# Patient Record
Sex: FEMALE | Race: WHITE | Employment: OTHER | URBAN - METROPOLITAN AREA
[De-identification: names, ages, dates, MRNs, and addresses within clinical notes are randomized per-mention and may not be internally consistent; named-entity substitution may affect disease eponyms.]

---

## 2017-01-04 LAB — HEMOGLOBIN: 14.9 G/DL (ref 12–16)

## 2017-01-23 ENCOUNTER — TELEPHONE (OUTPATIENT)
Dept: INTERNAL MEDICINE CLINIC | Age: 77
End: 2017-01-23

## 2017-01-23 DIAGNOSIS — M48.061 LUMBAR SPINAL STENOSIS: Primary | ICD-10-CM

## 2017-01-23 DIAGNOSIS — Z79.899 ENCOUNTER FOR LONG-TERM (CURRENT) USE OF MEDICATIONS: ICD-10-CM

## 2017-01-23 DIAGNOSIS — K76.0 FATTY LIVER: ICD-10-CM

## 2017-01-23 DIAGNOSIS — Z51.81 ENCOUNTER FOR THERAPEUTIC DRUG MONITORING: ICD-10-CM

## 2017-01-23 DIAGNOSIS — R73.02 GLUCOSE INTOLERANCE (IMPAIRED GLUCOSE TOLERANCE): ICD-10-CM

## 2017-01-23 DIAGNOSIS — I10 ESSENTIAL HYPERTENSION: ICD-10-CM

## 2017-01-23 DIAGNOSIS — E78.2 MIXED HYPERLIPIDEMIA: ICD-10-CM

## 2017-02-06 ENCOUNTER — OFFICE VISIT (OUTPATIENT)
Dept: INTERNAL MEDICINE CLINIC | Age: 77
End: 2017-02-06

## 2017-02-06 VITALS
HEART RATE: 68 BPM | SYSTOLIC BLOOD PRESSURE: 140 MMHG | RESPIRATION RATE: 12 BRPM | DIASTOLIC BLOOD PRESSURE: 70 MMHG | WEIGHT: 188.4 LBS | BODY MASS INDEX: 31.84 KG/M2

## 2017-02-06 DIAGNOSIS — R73.02 GLUCOSE INTOLERANCE (IMPAIRED GLUCOSE TOLERANCE): ICD-10-CM

## 2017-02-06 DIAGNOSIS — I10 ESSENTIAL HYPERTENSION: Primary | ICD-10-CM

## 2017-02-06 DIAGNOSIS — M48.061 LUMBAR SPINAL STENOSIS: ICD-10-CM

## 2017-02-06 DIAGNOSIS — E78.2 MIXED HYPERLIPIDEMIA: ICD-10-CM

## 2017-02-06 PROCEDURE — 99213 OFFICE O/P EST LOW 20 MIN: CPT | Performed by: INTERNAL MEDICINE

## 2017-02-06 RX ORDER — TRAMADOL HYDROCHLORIDE 200 MG/1
200 TABLET, EXTENDED RELEASE ORAL DAILY
Qty: 90 TABLET | Refills: 0 | Status: SHIPPED | OUTPATIENT
Start: 2017-02-06 | End: 2017-05-04 | Stop reason: SDUPTHER

## 2017-02-24 ENCOUNTER — OFFICE VISIT (OUTPATIENT)
Dept: INTERNAL MEDICINE CLINIC | Age: 77
End: 2017-02-24

## 2017-02-24 VITALS
BODY MASS INDEX: 31.43 KG/M2 | SYSTOLIC BLOOD PRESSURE: 150 MMHG | HEART RATE: 76 BPM | DIASTOLIC BLOOD PRESSURE: 80 MMHG | WEIGHT: 186 LBS

## 2017-02-24 DIAGNOSIS — I10 UNCONTROLLED HYPERTENSION: Primary | ICD-10-CM

## 2017-02-24 PROCEDURE — 99212 OFFICE O/P EST SF 10 MIN: CPT | Performed by: INTERNAL MEDICINE

## 2017-02-24 RX ORDER — LISINOPRIL 10 MG/1
10 TABLET ORAL DAILY
Qty: 30 TABLET | Refills: 3 | Status: SHIPPED | OUTPATIENT
Start: 2017-02-24 | End: 2017-05-04 | Stop reason: SDUPTHER

## 2017-03-07 RX ORDER — ROPINIROLE 1 MG/1
TABLET, FILM COATED ORAL
Qty: 90 TABLET | Refills: 3 | Status: SHIPPED | OUTPATIENT
Start: 2017-03-07 | End: 2017-08-16 | Stop reason: SDUPTHER

## 2017-03-23 ENCOUNTER — TELEPHONE (OUTPATIENT)
Dept: INTERNAL MEDICINE CLINIC | Age: 77
End: 2017-03-23

## 2017-03-24 ENCOUNTER — OFFICE VISIT (OUTPATIENT)
Dept: INTERNAL MEDICINE CLINIC | Age: 77
End: 2017-03-24

## 2017-03-24 VITALS
RESPIRATION RATE: 14 BRPM | BODY MASS INDEX: 31.37 KG/M2 | SYSTOLIC BLOOD PRESSURE: 120 MMHG | DIASTOLIC BLOOD PRESSURE: 70 MMHG | WEIGHT: 185.6 LBS | HEART RATE: 64 BPM

## 2017-03-24 DIAGNOSIS — M48.061 LUMBAR SPINAL STENOSIS: ICD-10-CM

## 2017-03-24 DIAGNOSIS — I10 ESSENTIAL HYPERTENSION: Primary | ICD-10-CM

## 2017-03-24 DIAGNOSIS — M62.830 LUMBAR PARASPINAL MUSCLE SPASM: ICD-10-CM

## 2017-03-24 DIAGNOSIS — E87.6 HYPOKALEMIA: ICD-10-CM

## 2017-03-24 LAB
ANION GAP SERPL CALCULATED.3IONS-SCNC: 14 MMOL/L (ref 3–16)
BUN BLDV-MCNC: 15 MG/DL (ref 7–20)
CALCIUM SERPL-MCNC: 9.4 MG/DL (ref 8.3–10.6)
CHLORIDE BLD-SCNC: 99 MMOL/L (ref 99–110)
CO2: 27 MMOL/L (ref 21–32)
CREAT SERPL-MCNC: 0.5 MG/DL (ref 0.6–1.2)
GFR AFRICAN AMERICAN: >60
GFR NON-AFRICAN AMERICAN: >60
GLUCOSE BLD-MCNC: 90 MG/DL (ref 70–99)
POTASSIUM SERPL-SCNC: 4 MMOL/L (ref 3.5–5.1)
SODIUM BLD-SCNC: 140 MMOL/L (ref 136–145)

## 2017-03-24 PROCEDURE — 99213 OFFICE O/P EST LOW 20 MIN: CPT | Performed by: INTERNAL MEDICINE

## 2017-03-24 PROCEDURE — 36415 COLL VENOUS BLD VENIPUNCTURE: CPT | Performed by: INTERNAL MEDICINE

## 2017-03-24 RX ORDER — TRAMADOL HYDROCHLORIDE 50 MG/1
TABLET ORAL
Qty: 270 TABLET | Refills: 0 | Status: SHIPPED | OUTPATIENT
Start: 2017-03-24 | End: 2017-05-04 | Stop reason: SDUPTHER

## 2017-03-24 ASSESSMENT — PATIENT HEALTH QUESTIONNAIRE - PHQ9
1. LITTLE INTEREST OR PLEASURE IN DOING THINGS: 0
SUM OF ALL RESPONSES TO PHQ QUESTIONS 1-9: 0
2. FEELING DOWN, DEPRESSED OR HOPELESS: 0
SUM OF ALL RESPONSES TO PHQ9 QUESTIONS 1 & 2: 0

## 2017-03-28 ENCOUNTER — TELEPHONE (OUTPATIENT)
Dept: INTERNAL MEDICINE CLINIC | Age: 77
End: 2017-03-28

## 2017-03-28 RX ORDER — METHYLPREDNISOLONE 4 MG/1
TABLET ORAL
Qty: 1 KIT | Refills: 0 | Status: SHIPPED | OUTPATIENT
Start: 2017-03-28 | End: 2017-03-31 | Stop reason: SINTOL

## 2017-03-28 RX ORDER — PROMETHAZINE HYDROCHLORIDE AND CODEINE PHOSPHATE 6.25; 1 MG/5ML; MG/5ML
5 SYRUP ORAL EVERY 6 HOURS PRN
Qty: 120 ML | Refills: 1 | Status: SHIPPED | OUTPATIENT
Start: 2017-03-28 | End: 2017-08-16

## 2017-05-04 RX ORDER — TRAMADOL HYDROCHLORIDE 200 MG/1
200 TABLET, EXTENDED RELEASE ORAL DAILY
Qty: 90 TABLET | Refills: 0 | Status: SHIPPED | OUTPATIENT
Start: 2017-05-04 | End: 2017-08-16 | Stop reason: SDUPTHER

## 2017-05-04 RX ORDER — LISINOPRIL 10 MG/1
10 TABLET ORAL DAILY
Qty: 30 TABLET | Refills: 5 | Status: SHIPPED | OUTPATIENT
Start: 2017-05-04 | End: 2017-05-12 | Stop reason: SDUPTHER

## 2017-05-04 RX ORDER — TRAMADOL HYDROCHLORIDE 50 MG/1
TABLET ORAL
Qty: 270 TABLET | Refills: 0 | Status: SHIPPED | OUTPATIENT
Start: 2017-05-04 | End: 2017-06-01 | Stop reason: SDUPTHER

## 2017-05-05 ENCOUNTER — TELEPHONE (OUTPATIENT)
Dept: INTERNAL MEDICINE CLINIC | Age: 77
End: 2017-05-05

## 2017-05-08 ENCOUNTER — OFFICE VISIT (OUTPATIENT)
Dept: INTERNAL MEDICINE CLINIC | Age: 77
End: 2017-05-08

## 2017-05-08 VITALS
SYSTOLIC BLOOD PRESSURE: 130 MMHG | RESPIRATION RATE: 14 BRPM | WEIGHT: 182 LBS | HEART RATE: 68 BPM | BODY MASS INDEX: 30.76 KG/M2 | DIASTOLIC BLOOD PRESSURE: 76 MMHG

## 2017-05-08 DIAGNOSIS — I10 ESSENTIAL HYPERTENSION: ICD-10-CM

## 2017-05-08 DIAGNOSIS — M62.830 LUMBAR PARASPINAL MUSCLE SPASM: ICD-10-CM

## 2017-05-08 DIAGNOSIS — M48.061 LUMBAR SPINAL STENOSIS: Primary | ICD-10-CM

## 2017-05-08 DIAGNOSIS — G25.81 RESTLESS LEG SYNDROME: ICD-10-CM

## 2017-05-08 PROCEDURE — 99213 OFFICE O/P EST LOW 20 MIN: CPT | Performed by: INTERNAL MEDICINE

## 2017-05-08 ASSESSMENT — PATIENT HEALTH QUESTIONNAIRE - PHQ9
1. LITTLE INTEREST OR PLEASURE IN DOING THINGS: 0
SUM OF ALL RESPONSES TO PHQ9 QUESTIONS 1 & 2: 0
SUM OF ALL RESPONSES TO PHQ QUESTIONS 1-9: 0
2. FEELING DOWN, DEPRESSED OR HOPELESS: 0

## 2017-05-12 RX ORDER — LISINOPRIL 10 MG/1
10 TABLET ORAL DAILY
Qty: 90 TABLET | Refills: 3 | Status: SHIPPED | OUTPATIENT
Start: 2017-05-12 | End: 2017-05-26 | Stop reason: SDUPTHER

## 2017-05-26 RX ORDER — LISINOPRIL 10 MG/1
10 TABLET ORAL DAILY
Qty: 90 TABLET | Refills: 3 | Status: SHIPPED | OUTPATIENT
Start: 2017-05-26 | End: 2018-07-24 | Stop reason: SDUPTHER

## 2017-05-31 RX ORDER — GABAPENTIN 300 MG/1
CAPSULE ORAL
Qty: 450 CAPSULE | Refills: 3 | Status: SHIPPED | OUTPATIENT
Start: 2017-05-31 | End: 2018-02-27 | Stop reason: SDUPTHER

## 2017-06-01 RX ORDER — TRAMADOL HYDROCHLORIDE 50 MG/1
TABLET ORAL
Qty: 270 TABLET | Refills: 0
Start: 2017-06-01 | End: 2017-08-16 | Stop reason: SDUPTHER

## 2017-06-29 ENCOUNTER — TELEPHONE (OUTPATIENT)
Dept: INTERNAL MEDICINE CLINIC | Age: 77
End: 2017-06-29

## 2017-06-29 RX ORDER — METHYLPREDNISOLONE 4 MG/1
TABLET ORAL
Qty: 1 KIT | Refills: 0 | Status: SHIPPED | OUTPATIENT
Start: 2017-06-29 | End: 2017-07-05

## 2017-07-10 ENCOUNTER — TELEPHONE (OUTPATIENT)
Dept: INTERNAL MEDICINE CLINIC | Age: 77
End: 2017-07-10

## 2017-07-10 DIAGNOSIS — M54.10 RADICULOPATHY OF LEG: ICD-10-CM

## 2017-07-10 DIAGNOSIS — M48.061 LUMBAR SPINAL STENOSIS: Primary | ICD-10-CM

## 2017-07-17 ENCOUNTER — TELEPHONE (OUTPATIENT)
Dept: INTERNAL MEDICINE CLINIC | Age: 77
End: 2017-07-17

## 2017-07-17 DIAGNOSIS — M54.16 LUMBAR RADICULAR PAIN: ICD-10-CM

## 2017-07-17 DIAGNOSIS — M48.061 LUMBAR SPINAL STENOSIS: Primary | ICD-10-CM

## 2017-08-16 ENCOUNTER — OFFICE VISIT (OUTPATIENT)
Dept: INTERNAL MEDICINE CLINIC | Age: 77
End: 2017-08-16

## 2017-08-16 VITALS
HEIGHT: 64 IN | DIASTOLIC BLOOD PRESSURE: 72 MMHG | SYSTOLIC BLOOD PRESSURE: 140 MMHG | BODY MASS INDEX: 30.93 KG/M2 | WEIGHT: 181.2 LBS | HEART RATE: 76 BPM

## 2017-08-16 DIAGNOSIS — Z12.11 COLON CANCER SCREENING: ICD-10-CM

## 2017-08-16 DIAGNOSIS — K21.9 GASTROESOPHAGEAL REFLUX DISEASE WITHOUT ESOPHAGITIS: ICD-10-CM

## 2017-08-16 DIAGNOSIS — E03.4 HYPOTHYROIDISM DUE TO ACQUIRED ATROPHY OF THYROID: ICD-10-CM

## 2017-08-16 DIAGNOSIS — I10 ESSENTIAL HYPERTENSION: Primary | ICD-10-CM

## 2017-08-16 DIAGNOSIS — R00.2 PALPITATIONS: ICD-10-CM

## 2017-08-16 DIAGNOSIS — R73.02 GLUCOSE INTOLERANCE (IMPAIRED GLUCOSE TOLERANCE): ICD-10-CM

## 2017-08-16 DIAGNOSIS — E55.9 VITAMIN D DEFICIENCY: ICD-10-CM

## 2017-08-16 DIAGNOSIS — M48.061 LUMBAR SPINAL STENOSIS: ICD-10-CM

## 2017-08-16 DIAGNOSIS — E78.2 MIXED HYPERLIPIDEMIA: ICD-10-CM

## 2017-08-16 LAB
A/G RATIO: 1.7 (ref 1.1–2.2)
ALBUMIN SERPL-MCNC: 4.1 G/DL (ref 3.4–5)
ALP BLD-CCNC: 55 U/L (ref 40–129)
ALT SERPL-CCNC: 26 U/L (ref 10–40)
ANION GAP SERPL CALCULATED.3IONS-SCNC: 10 MMOL/L (ref 3–16)
AST SERPL-CCNC: 23 U/L (ref 15–37)
BACTERIA: ABNORMAL /HPF
BASOPHILS ABSOLUTE: 0 K/UL (ref 0–0.2)
BASOPHILS RELATIVE PERCENT: 0.5 %
BILIRUB SERPL-MCNC: 0.6 MG/DL (ref 0–1)
BILIRUBIN URINE: NEGATIVE
BLOOD, URINE: NEGATIVE
BUN BLDV-MCNC: 13 MG/DL (ref 7–20)
CALCIUM SERPL-MCNC: 9.1 MG/DL (ref 8.3–10.6)
CHLORIDE BLD-SCNC: 99 MMOL/L (ref 99–110)
CHOLESTEROL, TOTAL: 151 MG/DL (ref 0–199)
CLARITY: ABNORMAL
CO2: 29 MMOL/L (ref 21–32)
COLOR: YELLOW
CREAT SERPL-MCNC: 0.5 MG/DL (ref 0.6–1.2)
EOSINOPHILS ABSOLUTE: 0.1 K/UL (ref 0–0.6)
EOSINOPHILS RELATIVE PERCENT: 1.3 %
EPITHELIAL CELLS, UA: 17 /HPF (ref 0–5)
ESTIMATED AVERAGE GLUCOSE: 108.3 MG/DL
GFR AFRICAN AMERICAN: >60
GFR NON-AFRICAN AMERICAN: >60
GLOBULIN: 2.4 G/DL
GLUCOSE BLD-MCNC: 106 MG/DL (ref 70–99)
GLUCOSE URINE: NEGATIVE MG/DL
HBA1C MFR BLD: 5.4 %
HCT VFR BLD CALC: 41.2 % (ref 36–48)
HDLC SERPL-MCNC: 40 MG/DL (ref 40–60)
HEMOGLOBIN: 13.7 G/DL (ref 12–16)
HYALINE CASTS: 9 /LPF (ref 0–8)
KETONES, URINE: NEGATIVE MG/DL
LDL CHOLESTEROL CALCULATED: 77 MG/DL
LEUKOCYTE ESTERASE, URINE: ABNORMAL
LYMPHOCYTES ABSOLUTE: 1.4 K/UL (ref 1–5.1)
LYMPHOCYTES RELATIVE PERCENT: 29.4 %
MCH RBC QN AUTO: 29.1 PG (ref 26–34)
MCHC RBC AUTO-ENTMCNC: 33.2 G/DL (ref 31–36)
MCV RBC AUTO: 87.8 FL (ref 80–100)
MICROSCOPIC EXAMINATION: YES
MONOCYTES ABSOLUTE: 0.5 K/UL (ref 0–1.3)
MONOCYTES RELATIVE PERCENT: 11 %
NEUTROPHILS ABSOLUTE: 2.7 K/UL (ref 1.7–7.7)
NEUTROPHILS RELATIVE PERCENT: 57.8 %
NITRITE, URINE: NEGATIVE
PDW BLD-RTO: 13.4 % (ref 12.4–15.4)
PH UA: 5.5
PLATELET # BLD: 158 K/UL (ref 135–450)
PMV BLD AUTO: 10.7 FL (ref 5–10.5)
POTASSIUM SERPL-SCNC: 3.9 MMOL/L (ref 3.5–5.1)
PROTEIN UA: NEGATIVE MG/DL
RBC # BLD: 4.69 M/UL (ref 4–5.2)
RBC UA: 7 /HPF (ref 0–4)
SODIUM BLD-SCNC: 138 MMOL/L (ref 136–145)
SPECIFIC GRAVITY UA: 1.02
TOTAL PROTEIN: 6.5 G/DL (ref 6.4–8.2)
TRIGL SERPL-MCNC: 169 MG/DL (ref 0–150)
TSH SERPL DL<=0.05 MIU/L-ACNC: 1.69 UIU/ML (ref 0.27–4.2)
UROBILINOGEN, URINE: 0.2 E.U./DL
VITAMIN D 25-HYDROXY: 44 NG/ML
VLDLC SERPL CALC-MCNC: 34 MG/DL
WBC # BLD: 4.8 K/UL (ref 4–11)
WBC UA: 7 /HPF (ref 0–5)

## 2017-08-16 PROCEDURE — 81001 URINALYSIS AUTO W/SCOPE: CPT | Performed by: INTERNAL MEDICINE

## 2017-08-16 PROCEDURE — 93000 ELECTROCARDIOGRAM COMPLETE: CPT | Performed by: INTERNAL MEDICINE

## 2017-08-16 PROCEDURE — 36415 COLL VENOUS BLD VENIPUNCTURE: CPT | Performed by: INTERNAL MEDICINE

## 2017-08-16 PROCEDURE — 99215 OFFICE O/P EST HI 40 MIN: CPT | Performed by: INTERNAL MEDICINE

## 2017-08-16 RX ORDER — TRAMADOL HYDROCHLORIDE 200 MG/1
200 TABLET, EXTENDED RELEASE ORAL DAILY
Qty: 90 TABLET | Refills: 0 | Status: SHIPPED | OUTPATIENT
Start: 2017-08-16 | End: 2017-11-15 | Stop reason: SDUPTHER

## 2017-08-16 RX ORDER — TRAMADOL HYDROCHLORIDE 50 MG/1
TABLET ORAL
Qty: 270 TABLET | Refills: 0 | Status: SHIPPED | OUTPATIENT
Start: 2017-08-16 | End: 2017-11-15 | Stop reason: SDUPTHER

## 2017-08-16 RX ORDER — ROPINIROLE 1 MG/1
TABLET, FILM COATED ORAL
Qty: 150 TABLET | Refills: 3 | Status: SHIPPED | OUTPATIENT
Start: 2017-08-16 | End: 2018-07-24 | Stop reason: SDUPTHER

## 2017-08-18 DIAGNOSIS — Z12.11 COLON CANCER SCREENING: ICD-10-CM

## 2017-08-18 LAB
CONTROL: NORMAL
HEMOCCULT STL QL: NORMAL

## 2017-08-30 ENCOUNTER — OFFICE VISIT (OUTPATIENT)
Dept: INTERNAL MEDICINE CLINIC | Age: 77
End: 2017-08-30

## 2017-08-30 VITALS — HEART RATE: 66 BPM | DIASTOLIC BLOOD PRESSURE: 70 MMHG | SYSTOLIC BLOOD PRESSURE: 125 MMHG

## 2017-08-30 DIAGNOSIS — M48.061 LUMBAR SPINAL STENOSIS: Primary | ICD-10-CM

## 2017-08-30 DIAGNOSIS — K76.0 FATTY LIVER: ICD-10-CM

## 2017-08-30 DIAGNOSIS — E78.1 HYPERTRIGLYCERIDEMIA: ICD-10-CM

## 2017-08-30 DIAGNOSIS — I10 ESSENTIAL HYPERTENSION: ICD-10-CM

## 2017-08-30 PROCEDURE — 99213 OFFICE O/P EST LOW 20 MIN: CPT | Performed by: INTERNAL MEDICINE

## 2017-11-01 ENCOUNTER — NURSE ONLY (OUTPATIENT)
Dept: INTERNAL MEDICINE CLINIC | Age: 77
End: 2017-11-01

## 2017-11-01 DIAGNOSIS — Z23 NEED FOR INFLUENZA VACCINATION: Primary | ICD-10-CM

## 2017-11-01 PROCEDURE — 90471 IMMUNIZATION ADMIN: CPT | Performed by: INTERNAL MEDICINE

## 2017-11-01 PROCEDURE — 90662 IIV NO PRSV INCREASED AG IM: CPT | Performed by: INTERNAL MEDICINE

## 2017-11-16 RX ORDER — TRAMADOL HYDROCHLORIDE 50 MG/1
TABLET ORAL
Qty: 270 TABLET | Refills: 0 | Status: SHIPPED | OUTPATIENT
Start: 2017-11-16 | End: 2018-02-20 | Stop reason: SDUPTHER

## 2017-11-16 RX ORDER — TRAMADOL HYDROCHLORIDE 200 MG/1
200 TABLET, EXTENDED RELEASE ORAL DAILY
Qty: 90 TABLET | Refills: 0 | Status: SHIPPED | OUTPATIENT
Start: 2017-11-16 | End: 2018-02-20 | Stop reason: SDUPTHER

## 2017-11-16 NOTE — TELEPHONE ENCOUNTER
Controlled Substances Monitoring:     Attestation: The Prescription Monitoring Report for this patient was reviewed today. Aline Quintero MD)  Documentation: No signs of potential drug abuse or diversion identified.  Aline Quintero MD)

## 2017-12-08 ENCOUNTER — TELEPHONE (OUTPATIENT)
Dept: INTERNAL MEDICINE CLINIC | Age: 77
End: 2017-12-08

## 2017-12-08 DIAGNOSIS — E78.2 MIXED HYPERLIPIDEMIA: ICD-10-CM

## 2017-12-08 DIAGNOSIS — K76.0 FATTY LIVER: Primary | ICD-10-CM

## 2017-12-08 DIAGNOSIS — R73.02 GLUCOSE INTOLERANCE (IMPAIRED GLUCOSE TOLERANCE): ICD-10-CM

## 2017-12-08 DIAGNOSIS — I10 ESSENTIAL HYPERTENSION: ICD-10-CM

## 2017-12-19 DIAGNOSIS — R73.02 GLUCOSE INTOLERANCE (IMPAIRED GLUCOSE TOLERANCE): ICD-10-CM

## 2017-12-19 DIAGNOSIS — I10 ESSENTIAL HYPERTENSION: ICD-10-CM

## 2017-12-19 DIAGNOSIS — E78.2 MIXED HYPERLIPIDEMIA: ICD-10-CM

## 2017-12-19 DIAGNOSIS — K76.0 FATTY LIVER: ICD-10-CM

## 2017-12-19 LAB
ALBUMIN SERPL-MCNC: 4.5 G/DL (ref 3.4–5)
ALP BLD-CCNC: 69 U/L (ref 40–129)
ALT SERPL-CCNC: 34 U/L (ref 10–40)
ANION GAP SERPL CALCULATED.3IONS-SCNC: 13 MMOL/L (ref 3–16)
AST SERPL-CCNC: 31 U/L (ref 15–37)
BASOPHILS ABSOLUTE: 0 K/UL (ref 0–0.2)
BASOPHILS RELATIVE PERCENT: 0.4 %
BILIRUB SERPL-MCNC: 0.7 MG/DL (ref 0–1)
BILIRUBIN DIRECT: <0.2 MG/DL (ref 0–0.3)
BILIRUBIN, INDIRECT: NORMAL MG/DL (ref 0–1)
BUN BLDV-MCNC: 15 MG/DL (ref 7–20)
CALCIUM SERPL-MCNC: 9.7 MG/DL (ref 8.3–10.6)
CHLORIDE BLD-SCNC: 100 MMOL/L (ref 99–110)
CHOLESTEROL, TOTAL: 162 MG/DL (ref 0–199)
CO2: 29 MMOL/L (ref 21–32)
CREAT SERPL-MCNC: 0.6 MG/DL (ref 0.6–1.2)
EOSINOPHILS ABSOLUTE: 0.1 K/UL (ref 0–0.6)
EOSINOPHILS RELATIVE PERCENT: 1.6 %
GFR AFRICAN AMERICAN: >60
GFR NON-AFRICAN AMERICAN: >60
GLUCOSE BLD-MCNC: 108 MG/DL (ref 70–99)
HCT VFR BLD CALC: 42.2 % (ref 36–48)
HDLC SERPL-MCNC: 39 MG/DL (ref 40–60)
HEMOGLOBIN: 13.7 G/DL (ref 12–16)
LDL CHOLESTEROL CALCULATED: 80 MG/DL
LYMPHOCYTES ABSOLUTE: 1.7 K/UL (ref 1–5.1)
LYMPHOCYTES RELATIVE PERCENT: 32.2 %
MCH RBC QN AUTO: 27.9 PG (ref 26–34)
MCHC RBC AUTO-ENTMCNC: 32.4 G/DL (ref 31–36)
MCV RBC AUTO: 86 FL (ref 80–100)
MONOCYTES ABSOLUTE: 0.5 K/UL (ref 0–1.3)
MONOCYTES RELATIVE PERCENT: 10.2 %
NEUTROPHILS ABSOLUTE: 2.8 K/UL (ref 1.7–7.7)
NEUTROPHILS RELATIVE PERCENT: 55.6 %
PDW BLD-RTO: 14.6 % (ref 12.4–15.4)
PLATELET # BLD: 176 K/UL (ref 135–450)
PMV BLD AUTO: 10.2 FL (ref 5–10.5)
POTASSIUM SERPL-SCNC: 4.9 MMOL/L (ref 3.5–5.1)
RBC # BLD: 4.91 M/UL (ref 4–5.2)
SODIUM BLD-SCNC: 142 MMOL/L (ref 136–145)
TOTAL PROTEIN: 7.3 G/DL (ref 6.4–8.2)
TRIGL SERPL-MCNC: 216 MG/DL (ref 0–150)
VLDLC SERPL CALC-MCNC: 43 MG/DL
WBC # BLD: 5.1 K/UL (ref 4–11)

## 2017-12-22 RX ORDER — INDAPAMIDE 1.25 MG/1
TABLET, FILM COATED ORAL
Qty: 90 TABLET | Refills: 3 | Status: SHIPPED | OUTPATIENT
Start: 2017-12-22 | End: 2018-12-17 | Stop reason: SDUPTHER

## 2017-12-26 ENCOUNTER — TELEPHONE (OUTPATIENT)
Dept: INTERNAL MEDICINE CLINIC | Age: 77
End: 2017-12-26

## 2017-12-26 RX ORDER — BENZONATATE 100 MG/1
100 CAPSULE ORAL 3 TIMES DAILY PRN
Qty: 30 CAPSULE | Refills: 0 | Status: SHIPPED | OUTPATIENT
Start: 2017-12-26 | End: 2018-01-05

## 2017-12-26 RX ORDER — OSELTAMIVIR PHOSPHATE 75 MG/1
75 CAPSULE ORAL 2 TIMES DAILY
Qty: 10 CAPSULE | Refills: 0 | Status: SHIPPED | OUTPATIENT
Start: 2017-12-26 | End: 2017-12-31

## 2017-12-26 NOTE — TELEPHONE ENCOUNTER
Pt called and states she has had a productive cough, sore throat, stuffed ears and headache since Sunday and has had no relief w/ OTC meds. She would like something called in for her if possible. [facility] on [address].

## 2017-12-29 ENCOUNTER — OFFICE VISIT (OUTPATIENT)
Dept: INTERNAL MEDICINE CLINIC | Age: 77
End: 2017-12-29

## 2017-12-29 VITALS
WEIGHT: 181.1 LBS | BODY MASS INDEX: 31.58 KG/M2 | DIASTOLIC BLOOD PRESSURE: 70 MMHG | HEART RATE: 88 BPM | SYSTOLIC BLOOD PRESSURE: 135 MMHG

## 2017-12-29 DIAGNOSIS — K76.0 FATTY LIVER: ICD-10-CM

## 2017-12-29 DIAGNOSIS — I10 ESSENTIAL HYPERTENSION: ICD-10-CM

## 2017-12-29 DIAGNOSIS — K11.20 SIALADENITIS: Primary | ICD-10-CM

## 2017-12-29 DIAGNOSIS — E78.2 MIXED HYPERLIPIDEMIA: ICD-10-CM

## 2017-12-29 PROCEDURE — 99213 OFFICE O/P EST LOW 20 MIN: CPT | Performed by: INTERNAL MEDICINE

## 2017-12-29 RX ORDER — DOXYCYCLINE HYCLATE 100 MG
100 TABLET ORAL 2 TIMES DAILY
Qty: 14 TABLET | Refills: 0 | Status: SHIPPED | OUTPATIENT
Start: 2017-12-29 | End: 2018-01-05

## 2018-01-06 NOTE — PROGRESS NOTES
S: Patient presents with problems of HTN, lumbar spinal stenosis with chronic low back pain, hyperlipidemia, fatty liver, and glucose intolerance. She was seen at [facility] by Dr Smita Alfredo, neurosurgery, for her chronic low back pain and was scheduled for surgery in Dec but decided to cancel this. No headache, chest pain, or breathing difficulties. No palpitations, dizziness, or syncope. She is having swelling under his left jaw. No fever or chills. O:Blood pressure 135/70, pulse 88, weight 181 lb 1.6 oz (82.1 kg), not currently breastfeeding.        HEENT: oral pharynx cleared, no facial swelling      Neck: No palpable lymph nodes, normal thyroid examination, left submandibular swelling and tenderness      Lungs: clear      Cardio: reg pulse      Back: bilateral lumbar paraspinal muscle spasms & tenderness       Ext: no edema        Labs: from 12/19/17 CBC normal,  Lytes Renal Liver normal, Glucose 108, LDL 80 HDL 39 Trig 216    A: Hypertension controlled      Lumbar spinal stenosis on chronic pain medication      Fatty liver       Glucose intolerance       Hyperlipidemia       Left submandibular sialadenitis     P: Copy of labs given      Continue low fat low carb diet for weight reduction      Warm compresses to left submandibular gland      Doxycycline 100 mg bid for 7 days & call results in 1 wk      Literature given on sialadenitis        Return in April with Basic chem Lipid Liver Hgba1c cpk cbc

## 2018-02-20 DIAGNOSIS — M48.061 SPINAL STENOSIS OF LUMBAR REGION WITHOUT NEUROGENIC CLAUDICATION: Primary | ICD-10-CM

## 2018-02-20 RX ORDER — TRAMADOL HYDROCHLORIDE 50 MG/1
TABLET ORAL
Qty: 270 TABLET | Refills: 0 | Status: SHIPPED | OUTPATIENT
Start: 2018-02-20 | End: 2018-05-17 | Stop reason: SDUPTHER

## 2018-02-20 RX ORDER — TRAMADOL HYDROCHLORIDE 200 MG/1
200 TABLET, EXTENDED RELEASE ORAL DAILY
Qty: 90 TABLET | Refills: 0 | Status: SHIPPED | OUTPATIENT
Start: 2018-02-20 | End: 2018-05-15 | Stop reason: SDUPTHER

## 2018-02-20 NOTE — TELEPHONE ENCOUNTER
Controlled Substances Monitoring: The Prescription Monitoring Report for this patient was reviewed today. Romana Valdes MD)    No signs of potential drug abuse or diversion identified.  Romana Valdes MD)

## 2018-02-27 DIAGNOSIS — M79.2 NEURITIS: Primary | ICD-10-CM

## 2018-02-27 RX ORDER — GABAPENTIN 300 MG/1
CAPSULE ORAL
Qty: 42 CAPSULE | Refills: 0 | Status: SHIPPED | OUTPATIENT
Start: 2018-02-27 | End: 2018-03-02 | Stop reason: SDUPTHER

## 2018-03-02 DIAGNOSIS — M79.2 NEURITIS: ICD-10-CM

## 2018-03-02 RX ORDER — GABAPENTIN 300 MG/1
CAPSULE ORAL
Qty: 42 CAPSULE | Refills: 0 | Status: SHIPPED | OUTPATIENT
Start: 2018-03-02 | End: 2018-03-07 | Stop reason: SDUPTHER

## 2018-03-06 DIAGNOSIS — M79.2 NEURITIS: ICD-10-CM

## 2018-03-06 DIAGNOSIS — M48.061 SPINAL STENOSIS OF LUMBAR REGION WITHOUT NEUROGENIC CLAUDICATION: ICD-10-CM

## 2018-03-06 RX ORDER — TRAMADOL HYDROCHLORIDE 50 MG/1
TABLET ORAL
Qty: 270 TABLET | Refills: 0 | OUTPATIENT
Start: 2018-03-06 | End: 2018-06-03

## 2018-03-06 RX ORDER — TRAMADOL HYDROCHLORIDE 200 MG/1
200 TABLET, EXTENDED RELEASE ORAL DAILY
Qty: 90 TABLET | Refills: 0 | OUTPATIENT
Start: 2018-03-06 | End: 2018-06-04

## 2018-03-06 RX ORDER — GABAPENTIN 300 MG/1
CAPSULE ORAL
Qty: 42 CAPSULE | Refills: 0 | OUTPATIENT
Start: 2018-03-06

## 2018-03-07 ENCOUNTER — ANTI-COAG VISIT (OUTPATIENT)
Dept: INTERNAL MEDICINE CLINIC | Age: 78
End: 2018-03-07

## 2018-03-07 ENCOUNTER — TELEPHONE (OUTPATIENT)
Dept: INTERNAL MEDICINE CLINIC | Age: 78
End: 2018-03-07

## 2018-03-07 RX ORDER — GABAPENTIN 300 MG/1
CAPSULE ORAL
Qty: 540 CAPSULE | Refills: 3 | Status: SHIPPED | OUTPATIENT
Start: 2018-03-07 | End: 2018-04-20 | Stop reason: SDUPTHER

## 2018-03-07 NOTE — TELEPHONE ENCOUNTER
Tramadol 50mg approval# [id number] exp 3/7/19  Tramadol 200mg approval # [id number]  Exp 3/7/19    Patient aware

## 2018-03-07 NOTE — TELEPHONE ENCOUNTER
Vimal Dorantes needs a 3 month rx for her Gabapentin. It was placed wrong last month.    She also needs us to call [facility] and let them know that it is ok to give her the Ultram.  [phone]

## 2018-04-11 DIAGNOSIS — K76.0 FATTY LIVER: ICD-10-CM

## 2018-04-11 DIAGNOSIS — E78.2 MIXED HYPERLIPIDEMIA: ICD-10-CM

## 2018-04-11 DIAGNOSIS — I10 ESSENTIAL HYPERTENSION: Primary | ICD-10-CM

## 2018-04-11 DIAGNOSIS — R73.02 GLUCOSE INTOLERANCE (IMPAIRED GLUCOSE TOLERANCE): ICD-10-CM

## 2018-04-13 DIAGNOSIS — E78.2 MIXED HYPERLIPIDEMIA: ICD-10-CM

## 2018-04-13 DIAGNOSIS — I10 ESSENTIAL HYPERTENSION: ICD-10-CM

## 2018-04-13 DIAGNOSIS — K76.0 FATTY LIVER: ICD-10-CM

## 2018-04-13 DIAGNOSIS — R73.02 GLUCOSE INTOLERANCE (IMPAIRED GLUCOSE TOLERANCE): ICD-10-CM

## 2018-04-13 LAB
ALBUMIN SERPL-MCNC: 4.3 G/DL (ref 3.4–5)
ALP BLD-CCNC: 66 U/L (ref 40–129)
ALT SERPL-CCNC: 33 U/L (ref 10–40)
ANION GAP SERPL CALCULATED.3IONS-SCNC: 13 MMOL/L (ref 3–16)
AST SERPL-CCNC: 25 U/L (ref 15–37)
BASOPHILS ABSOLUTE: 0 K/UL (ref 0–0.2)
BASOPHILS RELATIVE PERCENT: 0.7 %
BILIRUB SERPL-MCNC: 0.6 MG/DL (ref 0–1)
BILIRUBIN DIRECT: <0.2 MG/DL (ref 0–0.3)
BILIRUBIN, INDIRECT: NORMAL MG/DL (ref 0–1)
BUN BLDV-MCNC: 14 MG/DL (ref 7–20)
CALCIUM SERPL-MCNC: 9.3 MG/DL (ref 8.3–10.6)
CHLORIDE BLD-SCNC: 101 MMOL/L (ref 99–110)
CHOLESTEROL, TOTAL: 172 MG/DL (ref 0–199)
CO2: 29 MMOL/L (ref 21–32)
CREAT SERPL-MCNC: 0.5 MG/DL (ref 0.6–1.2)
EOSINOPHILS ABSOLUTE: 0.1 K/UL (ref 0–0.6)
EOSINOPHILS RELATIVE PERCENT: 1 %
GFR AFRICAN AMERICAN: >60
GFR NON-AFRICAN AMERICAN: >60
GLUCOSE BLD-MCNC: 103 MG/DL (ref 70–99)
HCT VFR BLD CALC: 43.5 % (ref 36–48)
HDLC SERPL-MCNC: 48 MG/DL (ref 40–60)
HEMOGLOBIN: 14.3 G/DL (ref 12–16)
LDL CHOLESTEROL CALCULATED: 94 MG/DL
LYMPHOCYTES ABSOLUTE: 1.7 K/UL (ref 1–5.1)
LYMPHOCYTES RELATIVE PERCENT: 28.7 %
MCH RBC QN AUTO: 28.6 PG (ref 26–34)
MCHC RBC AUTO-ENTMCNC: 32.9 G/DL (ref 31–36)
MCV RBC AUTO: 87 FL (ref 80–100)
MONOCYTES ABSOLUTE: 0.6 K/UL (ref 0–1.3)
MONOCYTES RELATIVE PERCENT: 9.4 %
NEUTROPHILS ABSOLUTE: 3.7 K/UL (ref 1.7–7.7)
NEUTROPHILS RELATIVE PERCENT: 60.2 %
PDW BLD-RTO: 15.1 % (ref 12.4–15.4)
PLATELET # BLD: 176 K/UL (ref 135–450)
PMV BLD AUTO: 10.1 FL (ref 5–10.5)
POTASSIUM SERPL-SCNC: 4.5 MMOL/L (ref 3.5–5.1)
RBC # BLD: 5 M/UL (ref 4–5.2)
SODIUM BLD-SCNC: 143 MMOL/L (ref 136–145)
TOTAL CK: 134 U/L (ref 26–192)
TOTAL PROTEIN: 7 G/DL (ref 6.4–8.2)
TRIGL SERPL-MCNC: 148 MG/DL (ref 0–150)
VLDLC SERPL CALC-MCNC: 30 MG/DL
WBC # BLD: 6.1 K/UL (ref 4–11)

## 2018-04-14 LAB
ESTIMATED AVERAGE GLUCOSE: 116.9 MG/DL
HBA1C MFR BLD: 5.7 %

## 2018-04-20 ENCOUNTER — OFFICE VISIT (OUTPATIENT)
Dept: INTERNAL MEDICINE CLINIC | Age: 78
End: 2018-04-20

## 2018-04-20 VITALS
BODY MASS INDEX: 31.24 KG/M2 | SYSTOLIC BLOOD PRESSURE: 130 MMHG | HEIGHT: 64 IN | DIASTOLIC BLOOD PRESSURE: 70 MMHG | HEART RATE: 56 BPM | WEIGHT: 183 LBS

## 2018-04-20 DIAGNOSIS — M79.2 NEURITIS: ICD-10-CM

## 2018-04-20 DIAGNOSIS — I10 ESSENTIAL HYPERTENSION: Primary | ICD-10-CM

## 2018-04-20 DIAGNOSIS — E78.2 MIXED HYPERLIPIDEMIA: ICD-10-CM

## 2018-04-20 DIAGNOSIS — R73.02 GLUCOSE INTOLERANCE (IMPAIRED GLUCOSE TOLERANCE): ICD-10-CM

## 2018-04-20 DIAGNOSIS — M48.061 SPINAL STENOSIS OF LUMBAR REGION WITHOUT NEUROGENIC CLAUDICATION: ICD-10-CM

## 2018-04-20 DIAGNOSIS — K76.0 FATTY LIVER: ICD-10-CM

## 2018-04-20 PROCEDURE — 99213 OFFICE O/P EST LOW 20 MIN: CPT | Performed by: INTERNAL MEDICINE

## 2018-04-20 RX ORDER — TRAMADOL HYDROCHLORIDE 50 MG/1
TABLET ORAL
Qty: 270 TABLET | Refills: 0 | Status: CANCELLED | OUTPATIENT
Start: 2018-04-20 | End: 2018-07-18

## 2018-04-20 RX ORDER — GABAPENTIN 300 MG/1
CAPSULE ORAL
Qty: 540 CAPSULE | Refills: 3 | Status: SHIPPED | OUTPATIENT
Start: 2018-04-20 | End: 2019-07-29 | Stop reason: SDUPTHER

## 2018-04-20 RX ORDER — TRAMADOL HYDROCHLORIDE 200 MG/1
200 TABLET, EXTENDED RELEASE ORAL DAILY
Qty: 90 TABLET | Refills: 0 | Status: CANCELLED | OUTPATIENT
Start: 2018-04-20 | End: 2018-07-19

## 2018-05-10 RX ORDER — METOPROLOL SUCCINATE 50 MG/1
TABLET, EXTENDED RELEASE ORAL
Qty: 90 TABLET | Refills: 3 | Status: SHIPPED | OUTPATIENT
Start: 2018-05-10 | End: 2019-05-06 | Stop reason: SDUPTHER

## 2018-05-15 DIAGNOSIS — M48.061 SPINAL STENOSIS OF LUMBAR REGION WITHOUT NEUROGENIC CLAUDICATION: ICD-10-CM

## 2018-05-15 RX ORDER — TRAMADOL HYDROCHLORIDE 200 MG/1
200 TABLET, EXTENDED RELEASE ORAL DAILY
Qty: 90 TABLET | Refills: 0 | Status: SHIPPED | OUTPATIENT
Start: 2018-05-15 | End: 2018-08-31 | Stop reason: SDUPTHER

## 2018-05-17 DIAGNOSIS — M48.061 SPINAL STENOSIS OF LUMBAR REGION WITHOUT NEUROGENIC CLAUDICATION: ICD-10-CM

## 2018-05-17 RX ORDER — TRAMADOL HYDROCHLORIDE 50 MG/1
TABLET ORAL
Qty: 270 TABLET | Refills: 0 | Status: SHIPPED | OUTPATIENT
Start: 2018-05-17 | End: 2018-08-31 | Stop reason: SDUPTHER

## 2018-07-24 RX ORDER — LISINOPRIL 10 MG/1
TABLET ORAL
Qty: 90 TABLET | Refills: 3 | Status: SHIPPED | OUTPATIENT
Start: 2018-07-24 | End: 2019-07-21 | Stop reason: SDUPTHER

## 2018-07-24 RX ORDER — ROPINIROLE 1 MG/1
TABLET, FILM COATED ORAL
Qty: 150 TABLET | Refills: 3 | Status: SHIPPED | OUTPATIENT
Start: 2018-07-24 | End: 2019-07-21 | Stop reason: SDUPTHER

## 2018-08-02 ENCOUNTER — TELEPHONE (OUTPATIENT)
Dept: INTERNAL MEDICINE CLINIC | Age: 78
End: 2018-08-02

## 2018-08-02 RX ORDER — METHYLPREDNISOLONE 4 MG/1
TABLET ORAL
Qty: 1 KIT | Refills: 0 | Status: SHIPPED | OUTPATIENT
Start: 2018-08-02 | End: 2018-08-08

## 2018-08-02 NOTE — TELEPHONE ENCOUNTER
Having real bad back and leg pain. Said you called her in something once before but doesn't remember what it was called.  Please call

## 2018-08-02 NOTE — TELEPHONE ENCOUNTER
Spoke with pt, she stated she would like to have rx called into [facility] on [address]. Rx was sent.

## 2018-08-31 ENCOUNTER — OFFICE VISIT (OUTPATIENT)
Dept: INTERNAL MEDICINE CLINIC | Age: 78
End: 2018-08-31

## 2018-08-31 VITALS
HEIGHT: 63 IN | WEIGHT: 184 LBS | DIASTOLIC BLOOD PRESSURE: 70 MMHG | BODY MASS INDEX: 32.6 KG/M2 | SYSTOLIC BLOOD PRESSURE: 120 MMHG | HEART RATE: 76 BPM

## 2018-08-31 DIAGNOSIS — E55.9 VITAMIN D DEFICIENCY: ICD-10-CM

## 2018-08-31 DIAGNOSIS — E03.4 HYPOTHYROIDISM DUE TO ACQUIRED ATROPHY OF THYROID: ICD-10-CM

## 2018-08-31 DIAGNOSIS — R73.09 ELEVATED HEMOGLOBIN A1C: ICD-10-CM

## 2018-08-31 DIAGNOSIS — M81.0 POST-MENOPAUSAL OSTEOPOROSIS: ICD-10-CM

## 2018-08-31 DIAGNOSIS — E78.2 MIXED HYPERLIPIDEMIA: ICD-10-CM

## 2018-08-31 DIAGNOSIS — Z23 NEED FOR PROPHYLACTIC VACCINATION AND INOCULATION AGAINST VARICELLA: ICD-10-CM

## 2018-08-31 DIAGNOSIS — M48.061 SPINAL STENOSIS OF LUMBAR REGION WITHOUT NEUROGENIC CLAUDICATION: Primary | ICD-10-CM

## 2018-08-31 DIAGNOSIS — I10 ESSENTIAL HYPERTENSION: ICD-10-CM

## 2018-08-31 DIAGNOSIS — Z12.39 BREAST SCREENING: ICD-10-CM

## 2018-08-31 DIAGNOSIS — R00.2 PALPITATIONS: ICD-10-CM

## 2018-08-31 DIAGNOSIS — M25.551 RIGHT HIP PAIN: ICD-10-CM

## 2018-08-31 LAB
A/G RATIO: 1.6 (ref 1.1–2.2)
ALBUMIN SERPL-MCNC: 4.4 G/DL (ref 3.4–5)
ALP BLD-CCNC: 57 U/L (ref 40–129)
ALT SERPL-CCNC: 29 U/L (ref 10–40)
ANION GAP SERPL CALCULATED.3IONS-SCNC: 12 MMOL/L (ref 3–16)
AST SERPL-CCNC: 27 U/L (ref 15–37)
BASOPHILS ABSOLUTE: 0 K/UL (ref 0–0.2)
BASOPHILS RELATIVE PERCENT: 0.7 %
BILIRUB SERPL-MCNC: 0.7 MG/DL (ref 0–1)
BUN BLDV-MCNC: 14 MG/DL (ref 7–20)
CALCIUM SERPL-MCNC: 9.7 MG/DL (ref 8.3–10.6)
CHLORIDE BLD-SCNC: 100 MMOL/L (ref 99–110)
CHOLESTEROL, TOTAL: 165 MG/DL (ref 0–199)
CO2: 29 MMOL/L (ref 21–32)
CREAT SERPL-MCNC: 0.6 MG/DL (ref 0.6–1.2)
EOSINOPHILS ABSOLUTE: 0.1 K/UL (ref 0–0.6)
EOSINOPHILS RELATIVE PERCENT: 1.4 %
GFR AFRICAN AMERICAN: >60
GFR NON-AFRICAN AMERICAN: >60
GLOBULIN: 2.7 G/DL
GLUCOSE BLD-MCNC: 119 MG/DL (ref 70–99)
HCT VFR BLD CALC: 42.3 % (ref 36–48)
HDLC SERPL-MCNC: 42 MG/DL (ref 40–60)
HEMOGLOBIN: 14 G/DL (ref 12–16)
LDL CHOLESTEROL CALCULATED: 88 MG/DL
LYMPHOCYTES ABSOLUTE: 1.5 K/UL (ref 1–5.1)
LYMPHOCYTES RELATIVE PERCENT: 30.7 %
MCH RBC QN AUTO: 29.2 PG (ref 26–34)
MCHC RBC AUTO-ENTMCNC: 33 G/DL (ref 31–36)
MCV RBC AUTO: 88.6 FL (ref 80–100)
MONOCYTES ABSOLUTE: 0.5 K/UL (ref 0–1.3)
MONOCYTES RELATIVE PERCENT: 10.8 %
NEUTROPHILS ABSOLUTE: 2.7 K/UL (ref 1.7–7.7)
NEUTROPHILS RELATIVE PERCENT: 56.4 %
PDW BLD-RTO: 14.3 % (ref 12.4–15.4)
PLATELET # BLD: 181 K/UL (ref 135–450)
PMV BLD AUTO: 10.6 FL (ref 5–10.5)
POTASSIUM SERPL-SCNC: 4.8 MMOL/L (ref 3.5–5.1)
RBC # BLD: 4.78 M/UL (ref 4–5.2)
SODIUM BLD-SCNC: 141 MMOL/L (ref 136–145)
TOTAL PROTEIN: 7.1 G/DL (ref 6.4–8.2)
TRIGL SERPL-MCNC: 173 MG/DL (ref 0–150)
TSH SERPL DL<=0.05 MIU/L-ACNC: 1.6 UIU/ML (ref 0.27–4.2)
VITAMIN D 25-HYDROXY: 32.8 NG/ML
VLDLC SERPL CALC-MCNC: 35 MG/DL
WBC # BLD: 4.9 K/UL (ref 4–11)

## 2018-08-31 PROCEDURE — 93000 ELECTROCARDIOGRAM COMPLETE: CPT | Performed by: INTERNAL MEDICINE

## 2018-08-31 PROCEDURE — 99215 OFFICE O/P EST HI 40 MIN: CPT | Performed by: INTERNAL MEDICINE

## 2018-08-31 RX ORDER — TRAMADOL HYDROCHLORIDE 200 MG/1
200 TABLET, EXTENDED RELEASE ORAL DAILY
Qty: 90 TABLET | Refills: 0 | Status: SHIPPED | OUTPATIENT
Start: 2018-08-31 | End: 2018-11-26 | Stop reason: SDUPTHER

## 2018-08-31 RX ORDER — TRAMADOL HYDROCHLORIDE 50 MG/1
TABLET ORAL
Qty: 270 TABLET | Refills: 0 | Status: SHIPPED | OUTPATIENT
Start: 2018-08-31 | End: 2018-11-28

## 2018-08-31 ASSESSMENT — PATIENT HEALTH QUESTIONNAIRE - PHQ9
1. LITTLE INTEREST OR PLEASURE IN DOING THINGS: 0
SUM OF ALL RESPONSES TO PHQ9 QUESTIONS 1 & 2: 0
2. FEELING DOWN, DEPRESSED OR HOPELESS: 0
SUM OF ALL RESPONSES TO PHQ QUESTIONS 1-9: 0
SUM OF ALL RESPONSES TO PHQ QUESTIONS 1-9: 0

## 2018-08-31 NOTE — PROGRESS NOTES
CAPSULES BY MOUTH DAILY AT 5:00 PM AND TAKE THREE CAPSULES BY MOUTH AT BEDTIME. 540 capsule 3    indapamide (LOZOL) 1.25 MG tablet TAKE 1 TABLET EVERY MORNING 90 tablet 3    Cholecalciferol (VITAMIN D3) 1000 UNITS CAPS Take  by mouth daily.  Multiple Vitamins-Minerals (CENTRUM SILVER PO) Take 1 tablet by mouth daily.  omeprazole (PRILOSEC) 20 MG capsule Take 1 capsule by mouth 2 times daily. (Patient taking differently: Take 20 mg by mouth Daily ) 60 capsule 11     No current facility-administered medications for this visit. ALL:  Penicillin and prevacid both cause a rash. Intolerant to fosamax, actonel, evista. SH:   No smoking or alcohol history. Avoids caffeine. FH:   Mother  age 80 of CVA. Father  age 80 of diabetic complications. ROS:  General:      No weight loss or anorexia. No fever, chills, or night sweats. Heent:         No headaches, voice change, hoarseness, or swallowing difficulties. Resp:          No Wheezing, coughing, shortness of breath, or hemoptysis. CV:              No rest or exertional chest pain. No  dizziness, or syncope. She has noted palpitations. GI:               No abdominal pain, change in bowel habits, hematochezia, or melanotic stools. :             No frequency, urgency, or hematuria. Neuro:         No symptoms of cranial nerve dysfunction, gait difficulties, or extremities weakness. Immun:       Up to date on influenza,  pneumovax, TD, prevnar, and zostavax. Needs Shingrix. PE:      VS:              Blood pressure 120/70, pulse 76, height 5' 2.5\" (1.588 m), weight 184 lb (83.5 kg), not currently breastfeeding. GEN:           In no acute distress. Alert & oriented X 3. HEENT:      TMs and auditory canals are clear. Pupils equal and reactive to light. EOMs intact. Fundi were clear and the discs were flat.

## 2018-09-01 LAB
ESTIMATED AVERAGE GLUCOSE: 116.9 MG/DL
HBA1C MFR BLD: 5.7 %

## 2018-09-06 LAB
BACTERIA: ABNORMAL /HPF
BILIRUBIN URINE: NEGATIVE
BLOOD, URINE: NEGATIVE
CLARITY: CLEAR
COLOR: YELLOW
EPITHELIAL CELLS, UA: 4 /HPF (ref 0–5)
GLUCOSE URINE: >=1000 MG/DL
HYALINE CASTS: 1 /LPF (ref 0–8)
KETONES, URINE: NEGATIVE MG/DL
LEUKOCYTE ESTERASE, URINE: ABNORMAL
MICROSCOPIC EXAMINATION: YES
NITRITE, URINE: NEGATIVE
PH UA: 6
PROTEIN UA: NEGATIVE MG/DL
RBC UA: 1 /HPF (ref 0–4)
SPECIFIC GRAVITY UA: 1.01
UROBILINOGEN, URINE: 0.2 E.U./DL
WBC UA: 7 /HPF (ref 0–5)

## 2018-09-11 ENCOUNTER — OFFICE VISIT (OUTPATIENT)
Dept: INTERNAL MEDICINE CLINIC | Age: 78
End: 2018-09-11

## 2018-09-11 VITALS — HEART RATE: 72 BPM | DIASTOLIC BLOOD PRESSURE: 70 MMHG | SYSTOLIC BLOOD PRESSURE: 138 MMHG

## 2018-09-11 DIAGNOSIS — M62.830 LUMBAR PARASPINAL MUSCLE SPASM: ICD-10-CM

## 2018-09-11 DIAGNOSIS — K21.9 GASTROESOPHAGEAL REFLUX DISEASE WITHOUT ESOPHAGITIS: ICD-10-CM

## 2018-09-11 DIAGNOSIS — R81 GLUCOSURIA: Primary | ICD-10-CM

## 2018-09-11 DIAGNOSIS — E74.39 GLUCOSE INTOLERANCE: ICD-10-CM

## 2018-09-11 DIAGNOSIS — M48.061 SPINAL STENOSIS OF LUMBAR REGION WITHOUT NEUROGENIC CLAUDICATION: ICD-10-CM

## 2018-09-11 LAB
BACTERIA: ABNORMAL /HPF
BILIRUBIN URINE: NEGATIVE
BLOOD, URINE: NEGATIVE
CLARITY: CLEAR
COLOR: YELLOW
COMMENT UA: ABNORMAL
EPITHELIAL CELLS, UA: 5 /HPF (ref 0–5)
GLUCOSE URINE: NEGATIVE MG/DL
HYALINE CASTS: 4 /LPF (ref 0–8)
KETONES, URINE: NEGATIVE MG/DL
LEUKOCYTE ESTERASE, URINE: ABNORMAL
MICROSCOPIC EXAMINATION: YES
NITRITE, URINE: NEGATIVE
PH UA: 5.5
PROTEIN UA: NEGATIVE MG/DL
RBC UA: 5 /HPF (ref 0–4)
SPECIFIC GRAVITY UA: 1.02
UROBILINOGEN, URINE: 0.2 E.U./DL
WBC UA: 5 /HPF (ref 0–5)

## 2018-09-11 PROCEDURE — 99213 OFFICE O/P EST LOW 20 MIN: CPT | Performed by: INTERNAL MEDICINE

## 2018-09-13 ENCOUNTER — HOSPITAL ENCOUNTER (OUTPATIENT)
Dept: GENERAL RADIOLOGY | Age: 78
Discharge: HOME | End: 2018-09-13
Attending: INTERNAL MEDICINE | Admitting: INTERNAL MEDICINE

## 2018-09-13 DIAGNOSIS — M81.0 POST-MENOPAUSAL OSTEOPOROSIS: ICD-10-CM

## 2018-09-13 DIAGNOSIS — Z12.39 BREAST SCREENING: ICD-10-CM

## 2018-09-13 DIAGNOSIS — M25.551 RIGHT HIP PAIN: ICD-10-CM

## 2018-09-14 DIAGNOSIS — M16.10 ARTHRITIS, HIP: Primary | ICD-10-CM

## 2018-09-24 ENCOUNTER — TELEPHONE (OUTPATIENT)
Dept: INTERNAL MEDICINE CLINIC | Age: 78
End: 2018-09-24

## 2018-10-04 ENCOUNTER — HOSPITAL ENCOUNTER (OUTPATIENT)
Dept: PHYSICAL THERAPY | Age: 78
Setting detail: THERAPIES SERIES
Discharge: HOME | End: 2018-10-04
Payer: MEDICARE

## 2018-10-04 PROCEDURE — G8979 MOBILITY GOAL STATUS: HCPCS

## 2018-10-04 PROCEDURE — 97161 PT EVAL LOW COMPLEX 20 MIN: CPT

## 2018-10-04 PROCEDURE — G8978 MOBILITY CURRENT STATUS: HCPCS

## 2018-10-04 PROCEDURE — 97110 THERAPEUTIC EXERCISES: CPT

## 2018-10-04 ASSESSMENT — PAIN DESCRIPTION - FREQUENCY: FREQUENCY: CONTINUOUS

## 2018-10-04 ASSESSMENT — PAIN DESCRIPTION - ONSET: ONSET: ON-GOING

## 2018-10-04 ASSESSMENT — PAIN DESCRIPTION - PAIN TYPE: TYPE: CHRONIC PAIN

## 2018-10-04 ASSESSMENT — PAIN DESCRIPTION - DESCRIPTORS: DESCRIPTORS: ACHING

## 2018-10-04 ASSESSMENT — PAIN DESCRIPTION - ORIENTATION: ORIENTATION: LOWER

## 2018-10-04 ASSESSMENT — PAIN DESCRIPTION - LOCATION: LOCATION: BACK

## 2018-10-04 ASSESSMENT — PAIN DESCRIPTION - PROGRESSION: CLINICAL_PROGRESSION: GRADUALLY WORSENING

## 2018-10-04 ASSESSMENT — PAIN SCALES - GENERAL: PAINLEVEL_OUTOF10: 3

## 2018-10-05 NOTE — PLAN OF CARE
weeks [x] 12 weeks  Rehab Potential: [] Excellent [x] Good [] Fair  [] Poor   Goals:  Short term goals  Time Frame for Short term goals: check in 10 sessions  Short term goal 1: 15/50 Mod Oswestry  Short term goal 2: complaint with HEP  Long term goals  Time Frame for Long term goals : 12 weeks   Long term goal 1: 10/50 Oswestry  Electronically signed by:  Dodie Sanchez PT, 10/5/2018, 9:32 AM        If you have any questions or concerns, please don't hesitate to call.   Thank you for your referral.    Physician Signature:_________________Date:____________Time: ________  By signing above, therapists plan is approved by physician

## 2018-10-05 NOTE — FLOWSHEET NOTE
Physical Therapy Daily Treatment Note  Date:  10/5/2018    Patient Name:  Adin Jiménez    :  1940  MRN: [id number]  Other position/activity restrictions: none    Medical Diagnosis:Lumbar stenosis  Treatment Diagnosis: LBP/B hip pain  Date of injury/Date of Surgery    Medical Insurance: Medicare Advantage  Referring Practitioner: Wili José  Referring Practitioner Follow-Up:     POC Signed: pending  POC Date Range:  10/4/18-19    Progress Note Due:  10 sessions  Visit# / total visits:                    Goals:  Short term goals  Time Frame for Short term goals: check in 10 sessions  Short term goal 1: 15/50 Mod Oswestry  Short term goal 2: complaint with HEP  Long term goals  Time Frame for Long term goals : 12 weeks   Long term goal 1: 10/50 Oswestry  Patient goals : pain relief    Summary of Eval:     Patient primary complaints: LBP/B hip pain  History of condition:chronic symptoms since lumbar surgery   Current functional limitations: pain present with routine ADL  PLOF:retired active  Prominent clinical findings: B hip IR limited- hip ABD strength @ least 4-/5  Progressive treatment plan will emphasize:ROM/ core rehab  Barriers to learning:none  Potential barriers to progress:chronic pain  The patient appears/reports motivated to regain PLOF: yes  INITIAL PAIN LEVEL: 3 /10 Low back/groin  SUBJECTIVE:      Any changes to Ambulatory Summary Sheet? Any major status changes?      ACTIVITIES: Date 10/4/18 Date Date   Outcomes Measure Mod Oswestry     Side ABD R/L 15 reps                                                                                                                                                                                                  HEP: Side ABD     Supervision/Cues:  Cued for exercise performance     Objectives: See eval     Response to intervention:  Rhip ABD fatigue     Post Tx Pain Rating: 3/10     Overall change since Evaluation:      Prognosis: good     Plan for Next Session: nustep- clam shells       Plan:  2__x/week x __12 weeks    Intervention used today:  [x] Therapeutic Exercise    [] Therapeutic Activity     [] Ultrasound  [] Elec  Stim  [] Gait Training      [] Cervical Traction [] Lumbar Traction  [] Neuromuscular Re-education    [] Cold/hotpack [] Iontophoresis   [x] Instruction in HEP      [] Vasopneumatic     [] Manual Therapy               [] Self care home management                    (    ) Dry needling  See eval  Time In:  Time Out:  Timed Code Treatment Minutes:     Total Treatment Minutes:      Electronically signed by:  Eliceo Mariee 10/5/2018, 9:51 AM

## 2018-10-09 ENCOUNTER — HOSPITAL ENCOUNTER (OUTPATIENT)
Dept: PHYSICAL THERAPY | Age: 78
Setting detail: THERAPIES SERIES
Discharge: HOME | End: 2018-10-09
Payer: MEDICARE

## 2018-10-09 PROCEDURE — 97110 THERAPEUTIC EXERCISES: CPT

## 2018-10-09 PROCEDURE — 97530 THERAPEUTIC ACTIVITIES: CPT

## 2018-10-09 PROCEDURE — 97112 NEUROMUSCULAR REEDUCATION: CPT

## 2018-10-09 NOTE — FLOWSHEET NOTE
HEP: Side ABD Start clam shells/ alternate marches    Supervision/Cues:  Cued for exercise performance Cued for exercise performance    Objectives: See eval     Response to intervention:  Rhip ABD fatigue Felt looser-pain decreased    Post Tx Pain Rating: 3/10 3/10    Overall change since Evaluation:      Prognosis: good good    Plan for Next Session: nustep- clam shells Focus on above interventions      Plan:  2__x/week x __12 weeks    Intervention used today:  [x] Therapeutic Exercise    [x] Therapeutic Activity     [] Ultrasound  [] Elec  Stim  [] Gait Training      [] Cervical Traction [] Lumbar Traction  [x] Neuromuscular Re-education    [] Cold/hotpack [] Iontophoresis   [x] Instruction in HEP      [] Vasopneumatic     [] Manual Therapy               [] Self care home management                    (    ) Dry needling    Time In:1730  Time Out:1810  Timed Code Treatment Minutes: 40   Total Treatment Minutes:  40    Electronically signed by:  Heber Oneill 10/9/2018, 5:27 PM

## 2018-10-15 ENCOUNTER — HOSPITAL ENCOUNTER (OUTPATIENT)
Dept: PHYSICAL THERAPY | Age: 78
Setting detail: THERAPIES SERIES
Discharge: HOME | End: 2018-10-15
Payer: MEDICARE

## 2018-10-15 PROCEDURE — 97110 THERAPEUTIC EXERCISES: CPT

## 2018-10-22 ENCOUNTER — HOSPITAL ENCOUNTER (OUTPATIENT)
Dept: PHYSICAL THERAPY | Age: 78
Setting detail: THERAPIES SERIES
Discharge: HOME | End: 2018-10-22
Payer: MEDICARE

## 2018-10-22 PROCEDURE — 97110 THERAPEUTIC EXERCISES: CPT

## 2018-10-22 NOTE — FLOWSHEET NOTE
marches 6\" - multiple taps with minimal UE support Alternate marches 6\" - multiple taps with minimal UE support       Side step outs 15 reps R/l Side step outs 15 reps R/l Side step outs 15 reps R/L     1/4 turns 15 reps R/L 1/4 turns 15 reps R/L 1/4 turns 15 reps R/L                                                                                                                                                                                   HEP: Side ABD Start clam shells/ alternate marches compliant compliant   Supervision/Cues:  Cued for exercise performance Cued for exercise performance Cued for exercise performance Cued for exercise performance   Objectives: See eval      Response to intervention:  Rhip ABD fatigue Felt looser-pain decreased Felt looser-pain decreased Felt looser   Post Tx Pain Rating: 3/10 3/10 2/10 2/10   Overall change since Evaluation:       Prognosis: good good good good   Plan for Next Session: nustep- clam shells Focus on above interventions Focus on above interventions Focus on above interventions     Plan:  2__x/week x __12 weeks    Intervention used today:  [x] Therapeutic Exercise    [x] Therapeutic Activity     [] Ultrasound  [] Elec  Stim  [] Gait Training      [] Cervical Traction [] Lumbar Traction  [x] Neuromuscular Re-education    [] Cold/hotpack [] Iontophoresis   [] Instruction in HEP      [] Vasopneumatic     [] Manual Therapy               [] Self care home management                    (    ) Dry needling    Time In:1202  Time [id number]  Timed Code Treatment Minutes: 31  Total Treatment Minutes: 31    Electronically signed by:  Magnolia Licona 10/22/2018, 12:16 PM

## 2018-10-29 ENCOUNTER — HOSPITAL ENCOUNTER (OUTPATIENT)
Dept: PHYSICAL THERAPY | Age: 78
Setting detail: THERAPIES SERIES
Discharge: HOME | End: 2018-10-29
Payer: MEDICARE

## 2018-10-29 PROCEDURE — 97110 THERAPEUTIC EXERCISES: CPT

## 2018-10-29 PROCEDURE — 97530 THERAPEUTIC ACTIVITIES: CPT

## 2018-10-29 NOTE — FLOWSHEET NOTE
Physical Therapy Daily Treatment Note  Date:  10/29/2018    Patient Name:  Azam Elena    :  1940  MRN: [id number]  Other position/activity restrictions: none    Medical Diagnosis:Lumbar stenosis  Treatment Diagnosis: LBP/B hip pain  Date of injury/Date of Surgery    Medical Insurance: Medicare Advantage  Referring Practitioner: Good Brewer  Referring Practitioner Follow-Up:     POC Signed: pending  POC Date Range:  10/4/18-19    Progress Note Due:  10 sessions  Visit# / total visits:                    Goals:  Short term goals  Time Frame for Short term goals: check in 10 sessions  Short term goal 1: 15/50 Mod Oswestry  Short term goal 2: complaint with HEP  Long term goals  Time Frame for Long term goals : 12 weeks   Long term goal 1: 10/50 Oswestry  Patient goals : pain relief    Summary of Eval:     Patient primary complaints: LBP/B hip pain  History of condition:chronic symptoms since lumbar surgery   Current functional limitations: pain present with routine ADL  PLOF:retired active  Prominent clinical findings: B hip IR limited- hip ABD strength @ least 4-/5  Progressive treatment plan will emphasize:ROM/ core rehab  Barriers to learning:none  Potential barriers to progress:chronic pain  The patient appears/reports motivated to regain PLOF: yes  INITIAL PAIN LEVEL: 2 /10 Low back/groin  SUBJECTIVE:  Patient reports pain falre up yesterday - better today  Any changes to Ambulatory Summary Sheet? Any major status changes?      ACTIVITIES: Date 10/4/18 Date 10/9/18 #2 Date 10/15/18 10/22/18 #4 10/29/18   Outcomes Measure Mod Oswestry       Side ABD R/L 15 reps With board 15 With board 15 With board 15        nustep load 3 seat9/arms 8.5 nustep load 3 seat9/arms 8.5x 6 min nustep load 3 seat9/arms 8.5x 6 min nustep load 3 seat9/arms x 6 min       Clam shells #s1//3 15 ea R/L Clam shells #s1//3 15 ea R/L Yellow TB Clam shells #s1//3 15 ea R/L Yellow TB Clam shells #s1//3 20 ea R/L Alternate marches 6\" - multiple taps with minimal UE support Alternate marches 6\" - multiple taps with minimal UE support Alternate marches 6\" - multiple taps with minimal UE support Alternate marches 6\" - multiple taps with minimal UE support       Side step outs 15 reps R/l Side step outs 15 reps R/l Side step outs 15 reps R/L Side step outs 15 reps R/L  With yellow TB around knees     1/4 turns 15 reps R/L 1/4 turns 15 reps R/L 1/4 turns 15 reps R/L 1/4 turns 15 reps R/L                                                                                                                                                                                                          HEP: Side ABD Start clam shells/ alternate marches compliant compliant compliant   Supervision/Cues:  Cued for exercise performance Cued for exercise performance Cued for exercise performance Cued for exercise performance Cued for exercise performance   Objectives: See eval       Response to intervention:  Rhip ABD fatigue Felt looser-pain decreased Felt looser-pain decreased Felt looser Felt looser   Post Tx Pain Rating: 3/10 3/10 2/10 2/10 2/10   Overall change since Evaluation:        Prognosis: good good good good good   Plan for Next Session: nustep- clam shells Focus on above interventions Focus on above interventions Focus on above interventions Focus on above interventions     Plan:  2__x/week x __12 weeks    Intervention used today:  [x] Therapeutic Exercise    [x] Therapeutic Activity     [] Ultrasound  [] Elec  Stim  [] Gait Training      [] Cervical Traction [] Lumbar Traction  [x] Neuromuscular Re-education    [] Cold/hotpack [] Iontophoresis   [] Instruction in HEP      [] Vasopneumatic     [] Manual Therapy               [] Self care home management                    (    ) Dry needling    Time In:1205  Time Out:1237  Timed Code Treatment Minutes: 23  Total Treatment Minutes: 23- PT was with other patients    Electronically signed

## 2018-10-31 ENCOUNTER — IMMUNIZATION (OUTPATIENT)
Dept: INTERNAL MEDICINE CLINIC | Age: 78
End: 2018-10-31
Payer: COMMERCIAL

## 2018-10-31 DIAGNOSIS — Z23 NEED FOR INFLUENZA VACCINATION: Primary | ICD-10-CM

## 2018-10-31 PROCEDURE — 90471 IMMUNIZATION ADMIN: CPT | Performed by: INTERNAL MEDICINE

## 2018-10-31 PROCEDURE — 90662 IIV NO PRSV INCREASED AG IM: CPT | Performed by: INTERNAL MEDICINE

## 2018-11-05 ENCOUNTER — HOSPITAL ENCOUNTER (OUTPATIENT)
Dept: PHYSICAL THERAPY | Age: 78
Setting detail: THERAPIES SERIES
Discharge: HOME | End: 2018-11-05
Payer: MEDICARE

## 2018-11-05 PROCEDURE — 97110 THERAPEUTIC EXERCISES: CPT

## 2018-11-12 ENCOUNTER — HOSPITAL ENCOUNTER (OUTPATIENT)
Dept: PHYSICAL THERAPY | Age: 78
Setting detail: THERAPIES SERIES
Discharge: HOME | End: 2018-11-12
Payer: MEDICARE

## 2018-11-12 PROCEDURE — 97530 THERAPEUTIC ACTIVITIES: CPT

## 2018-11-12 PROCEDURE — 97110 THERAPEUTIC EXERCISES: CPT

## 2018-11-19 ENCOUNTER — HOSPITAL ENCOUNTER (OUTPATIENT)
Dept: PHYSICAL THERAPY | Age: 78
Setting detail: THERAPIES SERIES
Discharge: HOME | End: 2018-11-19
Payer: MEDICARE

## 2018-11-19 PROCEDURE — 97110 THERAPEUTIC EXERCISES: CPT

## 2018-11-19 PROCEDURE — 97530 THERAPEUTIC ACTIVITIES: CPT

## 2018-11-26 DIAGNOSIS — M48.061 SPINAL STENOSIS OF LUMBAR REGION WITHOUT NEUROGENIC CLAUDICATION: ICD-10-CM

## 2018-11-26 NOTE — TELEPHONE ENCOUNTER
From: Tal Vance  Sent: 11/25/2018 12:39 PM EST  Subject: Medication Renewal Request    Tal Vance would like a refill of the following medications:     traMADol (ULTRAM) 50 MG tablet [BRITTANY Marsh MD]   Patient Comment: need script for this med - will      traMADol Anise [facility]) 200 MG extended release tablet [BRITTANY Marsh MD]   Patient Comment: need script for this med - will     Preferred pharmacy: [facility] [address] , [facility] [address] [phone] - F [phone]

## 2018-11-27 DIAGNOSIS — M48.061 SPINAL STENOSIS OF LUMBAR REGION WITHOUT NEUROGENIC CLAUDICATION: Primary | ICD-10-CM

## 2018-11-27 RX ORDER — TRAMADOL HYDROCHLORIDE 50 MG/1
50 TABLET ORAL
Qty: 270 TABLET | Refills: 0 | OUTPATIENT
Start: 2018-11-27 | End: 2019-02-24

## 2018-11-27 RX ORDER — TRAMADOL HYDROCHLORIDE 50 MG/1
50 TABLET ORAL 3 TIMES DAILY
Qty: 270 TABLET | Refills: 0 | Status: SHIPPED | OUTPATIENT
Start: 2018-11-27 | End: 2019-02-08 | Stop reason: SDUPTHER

## 2018-11-27 RX ORDER — TRAMADOL HYDROCHLORIDE 200 MG/1
200 TABLET, EXTENDED RELEASE ORAL DAILY
Qty: 90 TABLET | Refills: 0 | Status: SHIPPED | OUTPATIENT
Start: 2018-11-27 | End: 2019-02-08 | Stop reason: SDUPTHER

## 2018-12-17 ENCOUNTER — HOSPITAL ENCOUNTER (OUTPATIENT)
Dept: PHYSICAL THERAPY | Age: 78
Setting detail: THERAPIES SERIES
Discharge: HOME | End: 2018-12-17
Payer: MEDICARE

## 2018-12-17 PROCEDURE — 97110 THERAPEUTIC EXERCISES: CPT

## 2018-12-17 PROCEDURE — 97530 THERAPEUTIC ACTIVITIES: CPT

## 2018-12-17 RX ORDER — INDAPAMIDE 1.25 MG/1
TABLET, FILM COATED ORAL
Qty: 90 TABLET | Refills: 3 | Status: SHIPPED | OUTPATIENT
Start: 2018-12-17 | End: 2019-12-14 | Stop reason: SDUPTHER

## 2018-12-17 NOTE — FLOWSHEET NOTE
Alternate march 4\" - multiple taps with minimal UE support      Side step outs 15 reps R/L with yellow TB around ankles Side step outs 15 reps R/L with yellow TB around ankles Side step outs 10 reps R/L with yellow TB around ankles    1/4 turns 20 reps R/L 1/4 turns 20 reps R/L 1/4 turns 10 reps R/L                                                                                                                                                            HEP: compliant compliant    Supervision/Cues:  Cued for exercise performance Cued for exercise performance Cued for exercise performance   Objectives:      Response to intervention: Felt looser Felt looser- less soreness Felt looser- less pain   Post Tx Pain Ratin/10 1/10 3/10   Overall change since Evaluation:      Prognosis: good good good   Plan for Next Session: Focus on and progress above interventions Focus on and progress above interventions Focus on and progress above interventions     Plan: 1_x/week x __12 weeks    Intervention used today:  [x] Therapeutic Exercise    [x] Therapeutic Activity     [] Ultrasound  [] Elec  Stim  [] Gait Training      [] Cervical Traction [] Lumbar Traction  [x] Neuromuscular Re-education    [] Cold/hotpack [] Iontophoresis   [] Instruction in HEP      [] Vasopneumatic     [] Manual Therapy               [] Self care home management                    (    ) Dry needling    Time In:1203  Time [id number]  Timed Code Treatment Minutes: 23  Total Treatment Minutes:23- PT was with other patient    Electronically signed by:  Catracho Germain 2018, 12:17 PM

## 2018-12-26 ENCOUNTER — HOSPITAL ENCOUNTER (OUTPATIENT)
Dept: PHYSICAL THERAPY | Age: 78
Setting detail: THERAPIES SERIES
Discharge: HOME | End: 2018-12-26
Payer: MEDICARE

## 2018-12-26 PROCEDURE — G8979 MOBILITY GOAL STATUS: HCPCS

## 2018-12-26 PROCEDURE — G8978 MOBILITY CURRENT STATUS: HCPCS

## 2018-12-26 PROCEDURE — 97530 THERAPEUTIC ACTIVITIES: CPT

## 2018-12-26 PROCEDURE — 97110 THERAPEUTIC EXERCISES: CPT

## 2018-12-26 NOTE — FLOWSHEET NOTE
Physical Therapy Daily Treatment Note  Date:  2018    Patient Name:  Sunni Echevarria    :    MRN: [id number]  Other position/activity restrictions: none    Medical Diagnosis:Lumbar stenosis  Treatment Diagnosis: LBP/B hip pain  Date of injury/Date of Surgery    Medical Insurance: Medicare Advantage  Referring Practitioner: Shahida Hnikle  Referring Practitioner Follow-Up:     POC Signed: pending  POC Date Range:  10/4/18-19    Progress Note Due:  10 sessions  Visit# / total visits: 10/  20                  Goals:  Short term goals  Time Frame for Short term goals: check in 10 sessions  Short term goal 1: 15/50 Mod Oswestry  Short term goal 2: complaint with HEP  Long term goals  Time Frame for Long term goals : 12 weeks   Long term goal 1: 10/50 Oswestry  Patient goals : pain relief    Summary of Eval:     Patient primary complaints: LBP/B hip pain  History of condition:chronic symptoms since lumbar surgery   Current functional limitations: pain present with routine ADL  PLOF:retired active  Prominent clinical findings: B hip IR limited- hip ABD strength @ least 4-/5  Progressive treatment plan will emphasize:ROM/ core rehab  Barriers to learning:none  Potential barriers to progress:chronic pain  The patient appears/reports motivated to regain PLOF: yes  INITIAL PAIN LEVEL: 4/10 Low back/groin  SUBJECTIVE:  Patient reports pain has been present this morning in hips  Any changes to Ambulatory Summary Sheet? Any major status changes?      ACTIVITIES: 18 #9 18 #10   Outcomes Measure       Side ABD With board 15 With board 20 With board 10 Reps with board- yellow TB      nustep load 3 seat9 x 7 min nustep load 3 seat9 x 8 min nustep load 2 seat9 x 5 min nustep load 2 seat9 x 5 min      red TB Clam shells #s1/2/3 12 ea R/L red TB Clam shells #s1//3 20ea R/L yellow TB Clam shells #s1//3 15 ea R/L yellow TB Clam shells #s1//3 15 ea R/L    Alternate  6\" - multiple taps with minimal UE support Alternate  6\" - multiple taps with minimal UE support Alternate  4\" - multiple taps with minimal UE support Alternate  4\" - multiple taps with minimal UE support      Side step outs 15 reps R/L with yellow TB around ankles Side step outs 15 reps R/L with yellow TB around ankles Side step outs 10 reps R/L with yellow TB around ankles Side step outs 10 reps R/L    1/4 turns 20 reps R/L 1/4 turns 20 reps R/L 1/4 turns 10 reps R/L 1/4 turns 10 reps R/L         Standing ABD with yellow TB around ankles- ABD leg WBing through foot on towel on slide board x10 reps R/L                                                                                                                                                                          HEP: compliant compliant  compliant   Supervision/Cues:  Cued for exercise performance Cued for exercise performance Cued for exercise performance Cued for exercise performance   Objectives:       Response to intervention: Felt looser Felt looser- less soreness Felt looser- less pain Felt looser- pain same   Post Tx Pain Ratin/10 1/10 3/10 4/10   Overall change since Evaluation:    Patient reports PT has helped with her mobility   Prognosis: good good good good   Plan for Next Session: Focus on and progress above interventions Focus on and progress above interventions Focus on and progress above interventions Focus on and progress above interventions     Plan: 1_x/week x __12 weeks    Intervention used today:  [x] Therapeutic Exercise    [x] Therapeutic Activity     [] Ultrasound  [] Elec  Stim  [] Gait Training      [] Cervical Traction [] Lumbar Traction  [x] Neuromuscular Re-education    [] Cold/hotpack [] Iontophoresis   [] Instruction in HEP      [] Vasopneumatic     [] Manual Therapy               [] Self care home management                    (    ) Dry needling    Time In:1203  Time [id number]  Timed Code Treatment Minutes:

## 2018-12-28 DIAGNOSIS — E78.1 HYPERTRIGLYCERIDEMIA: ICD-10-CM

## 2018-12-28 DIAGNOSIS — E03.4 HYPOTHYROIDISM DUE TO ACQUIRED ATROPHY OF THYROID: ICD-10-CM

## 2018-12-28 DIAGNOSIS — R73.02 GLUCOSE INTOLERANCE (IMPAIRED GLUCOSE TOLERANCE): Primary | ICD-10-CM

## 2018-12-28 DIAGNOSIS — K76.0 FATTY LIVER: ICD-10-CM

## 2018-12-28 DIAGNOSIS — I10 UNCONTROLLED HYPERTENSION: ICD-10-CM

## 2018-12-31 ENCOUNTER — HOSPITAL ENCOUNTER (OUTPATIENT)
Dept: PHYSICAL THERAPY | Age: 78
Setting detail: THERAPIES SERIES
Discharge: HOME | End: 2018-12-31
Payer: MEDICARE

## 2018-12-31 PROCEDURE — 97110 THERAPEUTIC EXERCISES: CPT

## 2018-12-31 PROCEDURE — 97530 THERAPEUTIC ACTIVITIES: CPT

## 2019-01-07 DIAGNOSIS — R73.02 GLUCOSE INTOLERANCE (IMPAIRED GLUCOSE TOLERANCE): ICD-10-CM

## 2019-01-07 DIAGNOSIS — K76.0 FATTY LIVER: ICD-10-CM

## 2019-01-07 DIAGNOSIS — E78.1 HYPERTRIGLYCERIDEMIA: ICD-10-CM

## 2019-01-07 DIAGNOSIS — I10 UNCONTROLLED HYPERTENSION: ICD-10-CM

## 2019-01-07 LAB
BASOPHILS ABSOLUTE: 0 K/UL (ref 0–0.2)
BASOPHILS RELATIVE PERCENT: 1 %
EOSINOPHILS ABSOLUTE: 0.1 K/UL (ref 0–0.6)
EOSINOPHILS RELATIVE PERCENT: 1.3 %
HCT VFR BLD CALC: 42.6 % (ref 36–48)
HEMOGLOBIN: 13.8 G/DL (ref 12–16)
LYMPHOCYTES ABSOLUTE: 1.4 K/UL (ref 1–5.1)
LYMPHOCYTES RELATIVE PERCENT: 29.6 %
MCH RBC QN AUTO: 28.3 PG (ref 26–34)
MCHC RBC AUTO-ENTMCNC: 32.4 G/DL (ref 31–36)
MCV RBC AUTO: 87.3 FL (ref 80–100)
MONOCYTES ABSOLUTE: 0.4 K/UL (ref 0–1.3)
MONOCYTES RELATIVE PERCENT: 8.9 %
NEUTROPHILS ABSOLUTE: 2.9 K/UL (ref 1.7–7.7)
NEUTROPHILS RELATIVE PERCENT: 59.2 %
PDW BLD-RTO: 14 % (ref 12.4–15.4)
PLATELET # BLD: 166 K/UL (ref 135–450)
PMV BLD AUTO: 9.9 FL (ref 5–10.5)
RBC # BLD: 4.88 M/UL (ref 4–5.2)
WBC # BLD: 4.9 K/UL (ref 4–11)

## 2019-01-08 ENCOUNTER — HOSPITAL ENCOUNTER (OUTPATIENT)
Dept: PHYSICAL THERAPY | Age: 79
Setting detail: THERAPIES SERIES
Discharge: HOME | End: 2019-01-08
Payer: MEDICARE

## 2019-01-08 LAB
ALBUMIN SERPL-MCNC: 4.6 G/DL (ref 3.4–5)
ALP BLD-CCNC: 67 U/L (ref 40–129)
ALT SERPL-CCNC: 40 U/L (ref 10–40)
ANION GAP SERPL CALCULATED.3IONS-SCNC: 14 MMOL/L (ref 3–16)
AST SERPL-CCNC: 34 U/L (ref 15–37)
BILIRUB SERPL-MCNC: 0.4 MG/DL (ref 0–1)
BILIRUBIN DIRECT: <0.2 MG/DL (ref 0–0.3)
BILIRUBIN, INDIRECT: NORMAL MG/DL (ref 0–1)
BUN BLDV-MCNC: 10 MG/DL (ref 7–20)
CALCIUM SERPL-MCNC: 9.6 MG/DL (ref 8.3–10.6)
CHLORIDE BLD-SCNC: 100 MMOL/L (ref 99–110)
CHOLESTEROL, TOTAL: 161 MG/DL (ref 0–199)
CO2: 27 MMOL/L (ref 21–32)
CREAT SERPL-MCNC: 0.6 MG/DL (ref 0.6–1.2)
ESTIMATED AVERAGE GLUCOSE: 119.8 MG/DL
GFR AFRICAN AMERICAN: >60
GFR NON-AFRICAN AMERICAN: >60
GLUCOSE BLD-MCNC: 111 MG/DL (ref 70–99)
HBA1C MFR BLD: 5.8 %
HDLC SERPL-MCNC: 38 MG/DL (ref 40–60)
LDL CHOLESTEROL CALCULATED: 85 MG/DL
POTASSIUM SERPL-SCNC: 4.7 MMOL/L (ref 3.5–5.1)
SODIUM BLD-SCNC: 141 MMOL/L (ref 136–145)
TOTAL CK: 55 U/L (ref 26–192)
TOTAL PROTEIN: 7.2 G/DL (ref 6.4–8.2)
TRIGL SERPL-MCNC: 192 MG/DL (ref 0–150)
VLDLC SERPL CALC-MCNC: 38 MG/DL

## 2019-01-08 PROCEDURE — 97110 THERAPEUTIC EXERCISES: CPT

## 2019-01-08 PROCEDURE — 97530 THERAPEUTIC ACTIVITIES: CPT

## 2019-01-14 ENCOUNTER — HOSPITAL ENCOUNTER (OUTPATIENT)
Dept: PHYSICAL THERAPY | Age: 79
Setting detail: THERAPIES SERIES
Discharge: HOME | End: 2019-01-14
Payer: MEDICARE

## 2019-01-14 ENCOUNTER — OFFICE VISIT (OUTPATIENT)
Dept: INTERNAL MEDICINE CLINIC | Age: 79
End: 2019-01-14
Payer: MEDICARE

## 2019-01-14 VITALS
SYSTOLIC BLOOD PRESSURE: 125 MMHG | HEART RATE: 64 BPM | BODY MASS INDEX: 32.94 KG/M2 | WEIGHT: 183 LBS | RESPIRATION RATE: 16 BRPM | DIASTOLIC BLOOD PRESSURE: 70 MMHG

## 2019-01-14 DIAGNOSIS — E78.2 MIXED HYPERLIPIDEMIA: ICD-10-CM

## 2019-01-14 DIAGNOSIS — M48.061 SPINAL STENOSIS OF LUMBAR REGION WITHOUT NEUROGENIC CLAUDICATION: ICD-10-CM

## 2019-01-14 DIAGNOSIS — K21.9 GASTROESOPHAGEAL REFLUX DISEASE WITHOUT ESOPHAGITIS: ICD-10-CM

## 2019-01-14 DIAGNOSIS — I10 ESSENTIAL HYPERTENSION: Primary | ICD-10-CM

## 2019-01-14 DIAGNOSIS — R73.02 GLUCOSE INTOLERANCE (IMPAIRED GLUCOSE TOLERANCE): ICD-10-CM

## 2019-01-14 DIAGNOSIS — K76.0 FATTY LIVER: ICD-10-CM

## 2019-01-14 PROCEDURE — 97530 THERAPEUTIC ACTIVITIES: CPT

## 2019-01-14 PROCEDURE — 97110 THERAPEUTIC EXERCISES: CPT

## 2019-01-14 PROCEDURE — 99214 OFFICE O/P EST MOD 30 MIN: CPT | Performed by: INTERNAL MEDICINE

## 2019-01-21 ENCOUNTER — HOSPITAL ENCOUNTER (OUTPATIENT)
Dept: PHYSICAL THERAPY | Age: 79
Setting detail: THERAPIES SERIES
Discharge: HOME | End: 2019-01-21
Payer: MEDICARE

## 2019-01-21 PROCEDURE — 97110 THERAPEUTIC EXERCISES: CPT

## 2019-01-21 PROCEDURE — 97530 THERAPEUTIC ACTIVITIES: CPT

## 2019-01-28 ENCOUNTER — HOSPITAL ENCOUNTER (OUTPATIENT)
Dept: PHYSICAL THERAPY | Age: 79
Setting detail: THERAPIES SERIES
Discharge: HOME | End: 2019-01-28
Payer: MEDICARE

## 2019-01-28 PROCEDURE — 97530 THERAPEUTIC ACTIVITIES: CPT

## 2019-01-28 PROCEDURE — 97110 THERAPEUTIC EXERCISES: CPT

## 2019-02-08 DIAGNOSIS — M48.061 SPINAL STENOSIS OF LUMBAR REGION WITHOUT NEUROGENIC CLAUDICATION: ICD-10-CM

## 2019-02-11 RX ORDER — TRAMADOL HYDROCHLORIDE 200 MG/1
200 TABLET, EXTENDED RELEASE ORAL DAILY
Qty: 90 TABLET | Refills: 0 | Status: SHIPPED | OUTPATIENT
Start: 2019-02-11 | End: 2019-05-13 | Stop reason: SDUPTHER

## 2019-02-11 RX ORDER — TRAMADOL HYDROCHLORIDE 50 MG/1
50 TABLET ORAL 3 TIMES DAILY
Qty: 270 TABLET | Refills: 0 | Status: SHIPPED | OUTPATIENT
Start: 2019-02-11 | End: 2019-05-13 | Stop reason: SDUPTHER

## 2019-04-17 DIAGNOSIS — E78.2 MIXED HYPERLIPIDEMIA: ICD-10-CM

## 2019-04-17 DIAGNOSIS — R73.02 GLUCOSE INTOLERANCE (IMPAIRED GLUCOSE TOLERANCE): ICD-10-CM

## 2019-04-17 DIAGNOSIS — K76.0 FATTY LIVER: ICD-10-CM

## 2019-04-17 DIAGNOSIS — I10 ESSENTIAL HYPERTENSION: Primary | ICD-10-CM

## 2019-05-06 DIAGNOSIS — E78.2 MIXED HYPERLIPIDEMIA: ICD-10-CM

## 2019-05-06 DIAGNOSIS — K76.0 FATTY LIVER: ICD-10-CM

## 2019-05-06 DIAGNOSIS — I10 ESSENTIAL HYPERTENSION: ICD-10-CM

## 2019-05-06 DIAGNOSIS — R73.02 GLUCOSE INTOLERANCE (IMPAIRED GLUCOSE TOLERANCE): ICD-10-CM

## 2019-05-06 LAB
ALBUMIN SERPL-MCNC: 4.5 G/DL (ref 3.4–5)
ALP BLD-CCNC: 80 U/L (ref 40–129)
ALT SERPL-CCNC: 41 U/L (ref 10–40)
ANION GAP SERPL CALCULATED.3IONS-SCNC: 13 MMOL/L (ref 3–16)
AST SERPL-CCNC: 32 U/L (ref 15–37)
BASOPHILS ABSOLUTE: 0 K/UL (ref 0–0.2)
BASOPHILS RELATIVE PERCENT: 0.4 %
BILIRUB SERPL-MCNC: 0.7 MG/DL (ref 0–1)
BILIRUBIN DIRECT: <0.2 MG/DL (ref 0–0.3)
BILIRUBIN, INDIRECT: ABNORMAL MG/DL (ref 0–1)
BUN BLDV-MCNC: 14 MG/DL (ref 7–20)
CALCIUM SERPL-MCNC: 9.9 MG/DL (ref 8.3–10.6)
CHLORIDE BLD-SCNC: 100 MMOL/L (ref 99–110)
CHOLESTEROL, TOTAL: 166 MG/DL (ref 0–199)
CO2: 30 MMOL/L (ref 21–32)
CREAT SERPL-MCNC: 0.7 MG/DL (ref 0.6–1.2)
EOSINOPHILS ABSOLUTE: 0 K/UL (ref 0–0.6)
EOSINOPHILS RELATIVE PERCENT: 0.7 %
GFR AFRICAN AMERICAN: >60
GFR NON-AFRICAN AMERICAN: >60
GLUCOSE BLD-MCNC: 112 MG/DL (ref 70–99)
HCT VFR BLD CALC: 44.1 % (ref 36–48)
HDLC SERPL-MCNC: 45 MG/DL (ref 40–60)
HEMOGLOBIN: 14.5 G/DL (ref 12–16)
LDL CHOLESTEROL CALCULATED: 93 MG/DL
LYMPHOCYTES ABSOLUTE: 1.7 K/UL (ref 1–5.1)
LYMPHOCYTES RELATIVE PERCENT: 28 %
MCH RBC QN AUTO: 29.1 PG (ref 26–34)
MCHC RBC AUTO-ENTMCNC: 32.9 G/DL (ref 31–36)
MCV RBC AUTO: 88.4 FL (ref 80–100)
MONOCYTES ABSOLUTE: 0.5 K/UL (ref 0–1.3)
MONOCYTES RELATIVE PERCENT: 8.5 %
NEUTROPHILS ABSOLUTE: 3.8 K/UL (ref 1.7–7.7)
NEUTROPHILS RELATIVE PERCENT: 62.4 %
PDW BLD-RTO: 15.5 % (ref 12.4–15.4)
PLATELET # BLD: 195 K/UL (ref 135–450)
PMV BLD AUTO: 10.3 FL (ref 5–10.5)
POTASSIUM SERPL-SCNC: 4.7 MMOL/L (ref 3.5–5.1)
RBC # BLD: 4.99 M/UL (ref 4–5.2)
SODIUM BLD-SCNC: 143 MMOL/L (ref 136–145)
TOTAL CK: 74 U/L (ref 26–192)
TOTAL PROTEIN: 7.5 G/DL (ref 6.4–8.2)
TRIGL SERPL-MCNC: 141 MG/DL (ref 0–150)
VLDLC SERPL CALC-MCNC: 28 MG/DL
WBC # BLD: 6 K/UL (ref 4–11)

## 2019-05-06 RX ORDER — METOPROLOL SUCCINATE 50 MG/1
TABLET, EXTENDED RELEASE ORAL
Qty: 90 TABLET | Refills: 3 | Status: SHIPPED | OUTPATIENT
Start: 2019-05-06 | End: 2020-04-30 | Stop reason: SDUPTHER

## 2019-05-07 LAB
ESTIMATED AVERAGE GLUCOSE: 116.9 MG/DL
HBA1C MFR BLD: 5.7 %

## 2019-05-13 ENCOUNTER — OFFICE VISIT (OUTPATIENT)
Dept: INTERNAL MEDICINE CLINIC | Age: 79
End: 2019-05-13
Payer: MEDICARE

## 2019-05-13 VITALS
DIASTOLIC BLOOD PRESSURE: 82 MMHG | RESPIRATION RATE: 15 BRPM | SYSTOLIC BLOOD PRESSURE: 134 MMHG | HEART RATE: 68 BPM | BODY MASS INDEX: 32.58 KG/M2 | WEIGHT: 181 LBS

## 2019-05-13 DIAGNOSIS — I10 ESSENTIAL HYPERTENSION: ICD-10-CM

## 2019-05-13 DIAGNOSIS — R73.02 GLUCOSE INTOLERANCE (IMPAIRED GLUCOSE TOLERANCE): ICD-10-CM

## 2019-05-13 DIAGNOSIS — E78.2 MIXED HYPERLIPIDEMIA: ICD-10-CM

## 2019-05-13 DIAGNOSIS — M62.830 LUMBAR PARASPINAL MUSCLE SPASM: ICD-10-CM

## 2019-05-13 DIAGNOSIS — M48.061 SPINAL STENOSIS OF LUMBAR REGION WITHOUT NEUROGENIC CLAUDICATION: Primary | ICD-10-CM

## 2019-05-13 PROCEDURE — 99213 OFFICE O/P EST LOW 20 MIN: CPT | Performed by: INTERNAL MEDICINE

## 2019-05-13 RX ORDER — TRAMADOL HYDROCHLORIDE 50 MG/1
50 TABLET ORAL 3 TIMES DAILY
Qty: 270 TABLET | Refills: 0 | Status: SHIPPED | OUTPATIENT
Start: 2019-05-13 | End: 2019-09-17 | Stop reason: SDUPTHER

## 2019-05-13 RX ORDER — TRAMADOL HYDROCHLORIDE 200 MG/1
200 TABLET, EXTENDED RELEASE ORAL DAILY
Qty: 90 TABLET | Refills: 0 | Status: SHIPPED | OUTPATIENT
Start: 2019-05-13 | End: 2019-06-25 | Stop reason: SDUPTHER

## 2019-05-13 ASSESSMENT — PATIENT HEALTH QUESTIONNAIRE - PHQ9
SUM OF ALL RESPONSES TO PHQ QUESTIONS 1-9: 0
1. LITTLE INTEREST OR PLEASURE IN DOING THINGS: 0
SUM OF ALL RESPONSES TO PHQ QUESTIONS 1-9: 0
SUM OF ALL RESPONSES TO PHQ9 QUESTIONS 1 & 2: 0
2. FEELING DOWN, DEPRESSED OR HOPELESS: 0

## 2019-06-06 ENCOUNTER — TELEPHONE (OUTPATIENT)
Dept: INTERNAL MEDICINE CLINIC | Age: 79
End: 2019-06-06

## 2019-06-06 DIAGNOSIS — M48.061 SPINAL STENOSIS OF LUMBAR REGION WITHOUT NEUROGENIC CLAUDICATION: Primary | ICD-10-CM

## 2019-06-06 DIAGNOSIS — M62.830 LUMBAR PARASPINAL MUSCLE SPASM: ICD-10-CM

## 2019-06-06 NOTE — TELEPHONE ENCOUNTER
Becky Villalta called and wanted to know if she should see a spine doctor. She stated she would like to see   Dr. Molly Davis in [place].

## 2019-06-25 ENCOUNTER — TELEPHONE (OUTPATIENT)
Dept: INTERNAL MEDICINE CLINIC | Age: 79
End: 2019-06-25

## 2019-06-25 DIAGNOSIS — M48.061 SPINAL STENOSIS OF LUMBAR REGION WITHOUT NEUROGENIC CLAUDICATION: ICD-10-CM

## 2019-06-25 RX ORDER — TRAMADOL HYDROCHLORIDE 200 MG/1
200 TABLET, EXTENDED RELEASE ORAL DAILY
Qty: 90 TABLET | Refills: 0 | Status: SHIPPED | OUTPATIENT
Start: 2019-06-25 | End: 2019-09-17 | Stop reason: SDUPTHER

## 2019-06-25 NOTE — TELEPHONE ENCOUNTER
Controlled Substance Monitoring:    Acute and Chronic Pain Monitoring:   RX Monitoring 6/25/2019   Attestation -   Periodic Controlled Substance Monitoring No signs of potential drug abuse or diversion identified.

## 2019-07-22 RX ORDER — ROPINIROLE 1 MG/1
TABLET, FILM COATED ORAL
Qty: 150 TABLET | Refills: 3 | Status: SHIPPED | OUTPATIENT
Start: 2019-07-22

## 2019-07-22 RX ORDER — LISINOPRIL 10 MG/1
TABLET ORAL
Qty: 90 TABLET | Refills: 3 | Status: SHIPPED | OUTPATIENT
Start: 2019-07-22

## 2019-07-23 ENCOUNTER — TELEPHONE (OUTPATIENT)
Dept: INTERNAL MEDICINE CLINIC | Age: 79
End: 2019-07-23

## 2019-07-23 RX ORDER — ROPINIROLE 1 MG/1
1.5 TABLET, FILM COATED ORAL NIGHTLY
Qty: 8 TABLET | Refills: 0 | Status: SHIPPED | OUTPATIENT
Start: 2019-07-23 | End: 2019-07-25 | Stop reason: SDUPTHER

## 2019-07-25 RX ORDER — ROPINIROLE 1 MG/1
TABLET, FILM COATED ORAL
Qty: 8 TABLET | Refills: 0 | Status: SHIPPED | OUTPATIENT
Start: 2019-07-25 | End: 2019-09-03 | Stop reason: CLARIF

## 2019-07-29 DIAGNOSIS — M79.2 NEURITIS: ICD-10-CM

## 2019-07-30 RX ORDER — GABAPENTIN 300 MG/1
CAPSULE ORAL
Qty: 540 CAPSULE | Refills: 1 | Status: SHIPPED | OUTPATIENT
Start: 2019-07-30 | End: 2019-08-01 | Stop reason: SDUPTHER

## 2019-07-30 NOTE — TELEPHONE ENCOUNTER
Controlled Substance Monitoring:    Acute and Chronic Pain Monitoring:   RX Monitoring 7/30/2019   Attestation -   Periodic Controlled Substance Monitoring No signs of potential drug abuse or diversion identified.

## 2019-08-01 DIAGNOSIS — M79.2 NEURITIS: ICD-10-CM

## 2019-08-01 RX ORDER — GABAPENTIN 300 MG/1
CAPSULE ORAL
Qty: 540 CAPSULE | Refills: 1 | Status: SHIPPED | OUTPATIENT
Start: 2019-08-01 | End: 2019-11-25 | Stop reason: SDUPTHER

## 2019-09-03 ENCOUNTER — OFFICE VISIT (OUTPATIENT)
Dept: INTERNAL MEDICINE CLINIC | Age: 79
End: 2019-09-03
Payer: MEDICARE

## 2019-09-03 DIAGNOSIS — E78.2 MIXED HYPERLIPIDEMIA: ICD-10-CM

## 2019-09-03 DIAGNOSIS — I10 ESSENTIAL HYPERTENSION: Primary | ICD-10-CM

## 2019-09-03 DIAGNOSIS — E55.9 VITAMIN D DEFICIENCY: ICD-10-CM

## 2019-09-03 DIAGNOSIS — M48.061 SPINAL STENOSIS OF LUMBAR REGION WITHOUT NEUROGENIC CLAUDICATION: ICD-10-CM

## 2019-09-03 DIAGNOSIS — K76.0 FATTY LIVER: ICD-10-CM

## 2019-09-03 DIAGNOSIS — R73.09 ELEVATED HEMOGLOBIN A1C: ICD-10-CM

## 2019-09-03 DIAGNOSIS — K21.9 GASTROESOPHAGEAL REFLUX DISEASE WITHOUT ESOPHAGITIS: ICD-10-CM

## 2019-09-03 DIAGNOSIS — Z12.39 SCREENING FOR BREAST CANCER: ICD-10-CM

## 2019-09-03 DIAGNOSIS — R00.2 PALPITATIONS: ICD-10-CM

## 2019-09-03 PROCEDURE — 36415 COLL VENOUS BLD VENIPUNCTURE: CPT | Performed by: INTERNAL MEDICINE

## 2019-09-03 PROCEDURE — 99215 OFFICE O/P EST HI 40 MIN: CPT | Performed by: INTERNAL MEDICINE

## 2019-09-03 PROCEDURE — 93000 ELECTROCARDIOGRAM COMPLETE: CPT | Performed by: INTERNAL MEDICINE

## 2019-09-03 NOTE — PROGRESS NOTES
Comprehensive Exam      PI:         The patient is a 78year old white female with a history of severe lumbar spinal stenosis. She has seen Dr Nuha Givens and has received a series of epidural injections with little improvement. A nerve stimulator was placed with no improvement by Dr Binta Walden. She tried a TENS unit without good results. She has neurosurgery evaluation by Toribio Wheat plus the [facility] all of whom did not recommend surgery. She was scheduled for lumbar surgery at [address] [place] [address] with Dr Neida Alfonso in Dec 2017 but canceled the surgery. She continues with pain medication and gabapentin but still with continued pain. She is using Tramadol for pain control. Eye exams with Dr Sean Bella yearly with normal findings. There is a history of hypertension, fatty liver,  and hyperlipidemia. Using her medications as directed. She is following a low fat low carb low salt diet. Her GERD symptoms are controlled with Prilosec. No swallowing difficulties or pain. She had a colonoscopy on 7/6/15 by Dr Simeon Liu that revealed diverticulosis of the sigmoid with 2 adenomatous & 2 hyperplastic polyps. A five year follow up was recommended. No abdominal pain, change in bowel habits, hematochezia, or melanotic stools. PMH: Problems of hypertension, hyperlipidemia, lumbar spinal stenosis, diverticulosis, osteopenia, hiatal hernia, adenomatous polyps, and fatty liver. No history of diabetes, ASHD, or pulmonary disorder. Surgical history cholecystectomy, bilateral tubal ligation, bilateral cataract surgery with lens implants,  and lumbar herniated disc resection. MED:   Current Outpatient Medications   Medication Sig Dispense Refill    gabapentin (NEURONTIN) 300 MG capsule TAKE 3 CAPSULES DAILY AT 5:00 P. M. AND TAKE 3 CAPSULES AT BEDTIME 540 capsule 1    rOPINIRole (REQUIP) 1 MG tablet TAKE ONE AND ONE-HALF (1  1/2) TABLET BY MOUTH EVERY EVENING FOR 5 DAYS.  8 tablet 0    lisinopril

## 2019-09-04 LAB
A/G RATIO: 1.8 (ref 1.1–2.2)
ALBUMIN SERPL-MCNC: 4.5 G/DL (ref 3.4–5)
ALP BLD-CCNC: 70 U/L (ref 40–129)
ALT SERPL-CCNC: 34 U/L (ref 10–40)
ANION GAP SERPL CALCULATED.3IONS-SCNC: 13 MMOL/L (ref 3–16)
AST SERPL-CCNC: 32 U/L (ref 15–37)
BACTERIA: ABNORMAL /HPF
BASOPHILS ABSOLUTE: 0 K/UL (ref 0–0.2)
BASOPHILS RELATIVE PERCENT: 0.6 %
BILIRUB SERPL-MCNC: 0.6 MG/DL (ref 0–1)
BILIRUBIN URINE: NEGATIVE
BLOOD, URINE: NEGATIVE
BUN BLDV-MCNC: 9 MG/DL (ref 7–20)
CALCIUM SERPL-MCNC: 9.5 MG/DL (ref 8.3–10.6)
CHLORIDE BLD-SCNC: 100 MMOL/L (ref 99–110)
CHOLESTEROL, TOTAL: 142 MG/DL (ref 0–199)
CLARITY: ABNORMAL
CO2: 28 MMOL/L (ref 21–32)
COLOR: YELLOW
CREAT SERPL-MCNC: 0.5 MG/DL (ref 0.6–1.2)
EOSINOPHILS ABSOLUTE: 0.1 K/UL (ref 0–0.6)
EOSINOPHILS RELATIVE PERCENT: 1.3 %
EPITHELIAL CELLS, UA: 4 /HPF (ref 0–5)
ESTIMATED AVERAGE GLUCOSE: 111.2 MG/DL
GFR AFRICAN AMERICAN: >60
GFR NON-AFRICAN AMERICAN: >60
GLOBULIN: 2.5 G/DL
GLUCOSE BLD-MCNC: 112 MG/DL (ref 70–99)
GLUCOSE URINE: NEGATIVE MG/DL
HBA1C MFR BLD: 5.5 %
HCT VFR BLD CALC: 42.7 % (ref 36–48)
HDLC SERPL-MCNC: 39 MG/DL (ref 40–60)
HEMOGLOBIN: 14 G/DL (ref 12–16)
HYALINE CASTS: 2 /LPF (ref 0–8)
KETONES, URINE: NEGATIVE MG/DL
LDL CHOLESTEROL CALCULATED: 70 MG/DL
LEUKOCYTE ESTERASE, URINE: NEGATIVE
LYMPHOCYTES ABSOLUTE: 1.5 K/UL (ref 1–5.1)
LYMPHOCYTES RELATIVE PERCENT: 28.2 %
MCH RBC QN AUTO: 29 PG (ref 26–34)
MCHC RBC AUTO-ENTMCNC: 32.9 G/DL (ref 31–36)
MCV RBC AUTO: 88 FL (ref 80–100)
MICROSCOPIC EXAMINATION: YES
MONOCYTES ABSOLUTE: 0.5 K/UL (ref 0–1.3)
MONOCYTES RELATIVE PERCENT: 9.1 %
NEUTROPHILS ABSOLUTE: 3.3 K/UL (ref 1.7–7.7)
NEUTROPHILS RELATIVE PERCENT: 60.8 %
NITRITE, URINE: NEGATIVE
PDW BLD-RTO: 14 % (ref 12.4–15.4)
PH UA: 7.5 (ref 5–8)
PLATELET # BLD: 171 K/UL (ref 135–450)
PMV BLD AUTO: 10.6 FL (ref 5–10.5)
POTASSIUM SERPL-SCNC: 4.5 MMOL/L (ref 3.5–5.1)
PROTEIN UA: NEGATIVE MG/DL
RBC # BLD: 4.85 M/UL (ref 4–5.2)
RBC UA: 3 /HPF (ref 0–4)
SODIUM BLD-SCNC: 141 MMOL/L (ref 136–145)
SPECIFIC GRAVITY UA: 1.02 (ref 1–1.03)
TOTAL PROTEIN: 7 G/DL (ref 6.4–8.2)
TRIGL SERPL-MCNC: 164 MG/DL (ref 0–150)
TSH SERPL DL<=0.05 MIU/L-ACNC: 1.91 UIU/ML (ref 0.27–4.2)
UROBILINOGEN, URINE: 1 E.U./DL
VITAMIN D 25-HYDROXY: 35 NG/ML
VLDLC SERPL CALC-MCNC: 33 MG/DL
WBC # BLD: 5.4 K/UL (ref 4–11)
WBC UA: 2 /HPF (ref 0–5)

## 2019-09-14 VITALS
SYSTOLIC BLOOD PRESSURE: 130 MMHG | HEIGHT: 64 IN | WEIGHT: 182 LBS | HEART RATE: 80 BPM | BODY MASS INDEX: 31.07 KG/M2 | DIASTOLIC BLOOD PRESSURE: 70 MMHG

## 2019-09-17 ENCOUNTER — OFFICE VISIT (OUTPATIENT)
Dept: INTERNAL MEDICINE CLINIC | Age: 79
End: 2019-09-17
Payer: MEDICARE

## 2019-09-17 VITALS — DIASTOLIC BLOOD PRESSURE: 70 MMHG | SYSTOLIC BLOOD PRESSURE: 125 MMHG | HEART RATE: 80 BPM

## 2019-09-17 DIAGNOSIS — E78.2 MIXED HYPERLIPIDEMIA: ICD-10-CM

## 2019-09-17 DIAGNOSIS — I10 ESSENTIAL HYPERTENSION: ICD-10-CM

## 2019-09-17 DIAGNOSIS — K76.0 FATTY LIVER: ICD-10-CM

## 2019-09-17 DIAGNOSIS — M48.061 SPINAL STENOSIS OF LUMBAR REGION WITHOUT NEUROGENIC CLAUDICATION: Primary | ICD-10-CM

## 2019-09-17 DIAGNOSIS — I10 ESSENTIAL HYPERTENSION: Primary | ICD-10-CM

## 2019-09-17 DIAGNOSIS — K21.9 GASTROESOPHAGEAL REFLUX DISEASE WITHOUT ESOPHAGITIS: ICD-10-CM

## 2019-09-17 PROCEDURE — 99213 OFFICE O/P EST LOW 20 MIN: CPT | Performed by: INTERNAL MEDICINE

## 2019-09-17 RX ORDER — TRAMADOL HYDROCHLORIDE 200 MG/1
200 TABLET, EXTENDED RELEASE ORAL DAILY
Qty: 90 TABLET | Refills: 0 | Status: SHIPPED | OUTPATIENT
Start: 2019-09-17 | End: 2019-12-16

## 2019-09-17 RX ORDER — TRAMADOL HYDROCHLORIDE 50 MG/1
50 TABLET ORAL 3 TIMES DAILY
Qty: 270 TABLET | Refills: 0 | Status: SHIPPED | OUTPATIENT
Start: 2019-09-17 | End: 2019-12-16

## 2019-09-28 NOTE — PROGRESS NOTES
S: Patient presents with problems of HTN, lumbar spinal stenosis with chronic low back pain, hyperlipidemia, fatty liver, GERD, osteopenia, and glucose intolerance. She has chronic low back pain from her lumbar spinal stenosis. She is on chronic Tramadol pain medications and Gabapentin. No daytime somnolence or falls. No bowel or bladder dysfunction. No headache, chest pain, or breathing difficulties. No palpitations, dizziness, or syncope. Her GERD is controlled with Prilosec. No swallowing difficulties. Her BMI is 32. We discussed needed weight loss to help her chronic low back pain. O:Blood pressure 125/70, pulse 80, not currently breastfeeding. HEENT: oral pharynx cleared      Neck: No palpable lymph nodes, normal thyroid examination      Lungs: clear      Cardio: reg pulse      Abd: non tender, no distentions, BS normal, no organomegaly       Back: bilateral lumbar paraspinal muscle spasms & tenderness       Ext: no edema        Labs: from 9/3/19 CBC & UA normal, lytes normal, BUN 9 Creat 0.5, Glucose 112, Liver normal, LDL 70 HDL 39 Trig 164, Hgba1c 5.5%, Vit D 35,           EKG: NSR, normal     A: Hypertension controlled      Lumbar spinal stenosis on chronic pain medication      Lumbar paraspinal muscle spasms      Fatty liver       Glucose intolerance controlled       Hyperlipidemia controlled       GERD controlled with Prilosec       BMI 32    P: Copy of labs & EKG given      Ultram  mg 1 qAM #90 NR      Ultram 50 mg 2 tabs at 4PM and 1 tab qHS #270 NR      Water walking to help her back pain      High Dose Flu immunization in Oct      OV in 4M with basic chem lipid liver cbc    Controlled Substance Monitoring:    Acute and Chronic Pain Monitoring:   RX Monitoring 9/17/2019   Attestation -   Periodic Controlled Substance Monitoring No signs of potential drug abuse or diversion identified.                 H&P one year

## 2019-10-16 ENCOUNTER — IMMUNIZATION (OUTPATIENT)
Dept: INTERNAL MEDICINE CLINIC | Age: 79
End: 2019-10-16
Payer: MEDICARE

## 2019-10-16 DIAGNOSIS — Z23 NEED FOR INFLUENZA VACCINATION: Primary | ICD-10-CM

## 2019-10-16 PROCEDURE — G0008 ADMIN INFLUENZA VIRUS VAC: HCPCS | Performed by: INTERNAL MEDICINE

## 2019-10-16 PROCEDURE — 90653 IIV ADJUVANT VACCINE IM: CPT | Performed by: INTERNAL MEDICINE

## 2019-11-25 DIAGNOSIS — M79.2 NEURITIS: ICD-10-CM

## 2019-11-25 RX ORDER — GABAPENTIN 300 MG/1
CAPSULE ORAL
Qty: 540 CAPSULE | Refills: 1 | Status: SHIPPED | OUTPATIENT
Start: 2019-11-25 | End: 2020-05-27

## 2019-12-19 ENCOUNTER — OFFICE VISIT (OUTPATIENT)
Dept: INTERNAL MEDICINE CLINIC | Age: 79
End: 2019-12-19
Payer: MEDICARE

## 2019-12-19 VITALS
BODY MASS INDEX: 30.27 KG/M2 | DIASTOLIC BLOOD PRESSURE: 80 MMHG | RESPIRATION RATE: 18 BRPM | TEMPERATURE: 98.4 F | HEART RATE: 82 BPM | WEIGHT: 173.6 LBS | SYSTOLIC BLOOD PRESSURE: 138 MMHG | OXYGEN SATURATION: 98 %

## 2019-12-19 DIAGNOSIS — M54.9 MUSCULOSKELETAL BACK PAIN: ICD-10-CM

## 2019-12-19 PROCEDURE — 99213 OFFICE O/P EST LOW 20 MIN: CPT | Performed by: FAMILY MEDICINE

## 2019-12-19 RX ORDER — TRAMADOL HYDROCHLORIDE 50 MG/1
50 TABLET ORAL EVERY 6 HOURS PRN
COMMUNITY

## 2019-12-19 ASSESSMENT — ENCOUNTER SYMPTOMS
TROUBLE SWALLOWING: 0
EYE ITCHING: 0
SHORTNESS OF BREATH: 0
CONSTIPATION: 0
SORE THROAT: 0
VOMITING: 0
SINUS PAIN: 0
DIARRHEA: 0
BACK PAIN: 0
ABDOMINAL DISTENTION: 0
COUGH: 0
ABDOMINAL PAIN: 0
WHEEZING: 0
EYE REDNESS: 0
CHEST TIGHTNESS: 0
NAUSEA: 0

## 2019-12-26 ENCOUNTER — OFFICE VISIT (OUTPATIENT)
Dept: FAMILY MEDICINE CLINIC | Age: 79
End: 2019-12-26
Payer: MEDICARE

## 2019-12-26 ENCOUNTER — TELEPHONE (OUTPATIENT)
Dept: INTERNAL MEDICINE CLINIC | Age: 79
End: 2019-12-26

## 2019-12-26 VITALS
WEIGHT: 176 LBS | SYSTOLIC BLOOD PRESSURE: 140 MMHG | BODY MASS INDEX: 31.18 KG/M2 | DIASTOLIC BLOOD PRESSURE: 80 MMHG | HEART RATE: 85 BPM | RESPIRATION RATE: 15 BRPM | HEIGHT: 63 IN | OXYGEN SATURATION: 97 %

## 2019-12-26 DIAGNOSIS — G89.29 CHRONIC LEFT-SIDED THORACIC BACK PAIN: Primary | ICD-10-CM

## 2019-12-26 DIAGNOSIS — M54.6 CHRONIC LEFT-SIDED THORACIC BACK PAIN: Primary | ICD-10-CM

## 2019-12-26 PROCEDURE — 99213 OFFICE O/P EST LOW 20 MIN: CPT | Performed by: NURSE PRACTITIONER

## 2019-12-26 RX ORDER — TIZANIDINE 2 MG/1
2 TABLET ORAL 2 TIMES DAILY
Qty: 30 TABLET | Refills: 0 | Status: ON HOLD | OUTPATIENT
Start: 2019-12-26 | End: 2020-01-20

## 2019-12-26 ASSESSMENT — ENCOUNTER SYMPTOMS: BACK PAIN: 1

## 2019-12-27 ENCOUNTER — HOSPITAL ENCOUNTER (OUTPATIENT)
Age: 79
Discharge: HOME | End: 2019-12-27
Payer: MEDICARE

## 2019-12-27 ENCOUNTER — TELEPHONE (OUTPATIENT)
Dept: FAMILY MEDICINE CLINIC | Age: 79
End: 2019-12-27

## 2019-12-27 ENCOUNTER — HOSPITAL ENCOUNTER (OUTPATIENT)
Dept: GENERAL RADIOLOGY | Age: 79
Discharge: HOME | End: 2019-12-27
Payer: MEDICARE

## 2019-12-27 DIAGNOSIS — M54.6 CHRONIC LEFT-SIDED THORACIC BACK PAIN: ICD-10-CM

## 2019-12-27 DIAGNOSIS — G89.29 CHRONIC LEFT-SIDED THORACIC BACK PAIN: ICD-10-CM

## 2019-12-27 PROCEDURE — 71046 X-RAY EXAM CHEST 2 VIEWS: CPT

## 2019-12-27 ASSESSMENT — ENCOUNTER SYMPTOMS
ABDOMINAL PAIN: 0
SORE THROAT: 0
BOWEL INCONTINENCE: 0
WHEEZING: 0
DIARRHEA: 0
SHORTNESS OF BREATH: 0
COUGH: 0
CONSTIPATION: 0
NAUSEA: 0
CHEST TIGHTNESS: 0
TROUBLE SWALLOWING: 0

## 2019-12-30 ENCOUNTER — TELEPHONE (OUTPATIENT)
Dept: FAMILY MEDICINE CLINIC | Age: 79
End: 2019-12-30

## 2019-12-30 DIAGNOSIS — I10 ESSENTIAL HYPERTENSION: Primary | ICD-10-CM

## 2019-12-31 ENCOUNTER — OFFICE VISIT (OUTPATIENT)
Dept: INTERNAL MEDICINE CLINIC | Age: 79
End: 2019-12-31
Payer: MEDICARE

## 2019-12-31 VITALS — OXYGEN SATURATION: 98 % | HEART RATE: 84 BPM | DIASTOLIC BLOOD PRESSURE: 74 MMHG | SYSTOLIC BLOOD PRESSURE: 132 MMHG

## 2019-12-31 PROCEDURE — 99213 OFFICE O/P EST LOW 20 MIN: CPT | Performed by: FAMILY MEDICINE

## 2019-12-31 RX ORDER — ALPRAZOLAM 0.5 MG/1
0.5 TABLET ORAL 3 TIMES DAILY PRN
Qty: 3 TABLET | Refills: 0 | Status: SHIPPED | OUTPATIENT
Start: 2019-12-31 | End: 2020-01-01

## 2019-12-31 RX ORDER — FAMOTIDINE 20 MG/1
20 TABLET, FILM COATED ORAL 2 TIMES DAILY
Qty: 60 TABLET | Refills: 0 | Status: SHIPPED | OUTPATIENT
Start: 2019-12-31 | End: 2020-01-28

## 2020-01-01 ASSESSMENT — ENCOUNTER SYMPTOMS
EYE ITCHING: 0
COUGH: 0
CONSTIPATION: 0
SINUS PAIN: 0
VOMITING: 0
DIARRHEA: 0
SHORTNESS OF BREATH: 0
EYE REDNESS: 0
TROUBLE SWALLOWING: 0
SORE THROAT: 0
NAUSEA: 0
ABDOMINAL PAIN: 0
BACK PAIN: 1
CHEST TIGHTNESS: 0
ABDOMINAL DISTENTION: 0
WHEEZING: 0

## 2020-01-03 ENCOUNTER — HOSPITAL ENCOUNTER (OUTPATIENT)
Age: 80
Setting detail: SPECIMEN
Discharge: HOME | End: 2020-01-03
Payer: MEDICARE

## 2020-01-03 ENCOUNTER — TELEPHONE (OUTPATIENT)
Dept: INTERNAL MEDICINE CLINIC | Age: 80
End: 2020-01-03

## 2020-01-03 DIAGNOSIS — I10 ESSENTIAL HYPERTENSION: ICD-10-CM

## 2020-01-03 DIAGNOSIS — K76.0 FATTY LIVER: ICD-10-CM

## 2020-01-03 DIAGNOSIS — E78.2 MIXED HYPERLIPIDEMIA: ICD-10-CM

## 2020-01-03 LAB
ALBUMIN SERPL-MCNC: 4.3 GM/DL (ref 3.4–5)
ALP BLD-CCNC: 93 IU/L (ref 40–129)
ALT SERPL-CCNC: 21 U/L (ref 10–40)
ANION GAP SERPL CALCULATED.3IONS-SCNC: 15 MMOL/L (ref 4–16)
AST SERPL-CCNC: 21 IU/L (ref 15–37)
BASOPHILS ABSOLUTE: 0 K/CU MM
BASOPHILS RELATIVE PERCENT: 0.4 % (ref 0–1)
BILIRUB SERPL-MCNC: 0.4 MG/DL (ref 0–1)
BILIRUBIN DIRECT: 0.2 MG/DL (ref 0–0.3)
BILIRUBIN, INDIRECT: 0.2 MG/DL (ref 0–0.7)
BUN BLDV-MCNC: 8 MG/DL (ref 6–23)
CALCIUM SERPL-MCNC: 9.4 MG/DL (ref 8.3–10.6)
CHLORIDE BLD-SCNC: 98 MMOL/L (ref 99–110)
CHOLESTEROL: 140 MG/DL
CO2: 26 MMOL/L (ref 21–32)
CREAT SERPL-MCNC: 0.6 MG/DL (ref 0.6–1.1)
DIFFERENTIAL TYPE: ABNORMAL
EOSINOPHILS ABSOLUTE: 0.1 K/CU MM
EOSINOPHILS RELATIVE PERCENT: 1.8 % (ref 0–3)
GFR AFRICAN AMERICAN: >60 ML/MIN/1.73M2
GFR NON-AFRICAN AMERICAN: >60 ML/MIN/1.73M2
GLUCOSE BLD-MCNC: 116 MG/DL (ref 70–99)
HCT VFR BLD CALC: 42.8 % (ref 37–47)
HDLC SERPL-MCNC: 44 MG/DL
HEMOGLOBIN: 13.2 GM/DL (ref 12.5–16)
IMMATURE NEUTROPHIL %: 0.4 % (ref 0–0.43)
LDL CHOLESTEROL DIRECT: 79 MG/DL
LYMPHOCYTES ABSOLUTE: 1.3 K/CU MM
LYMPHOCYTES RELATIVE PERCENT: 26 % (ref 24–44)
MCH RBC QN AUTO: 27.1 PG (ref 27–31)
MCHC RBC AUTO-ENTMCNC: 30.8 % (ref 32–36)
MCV RBC AUTO: 87.9 FL (ref 78–100)
MONOCYTES ABSOLUTE: 0.4 K/CU MM
MONOCYTES RELATIVE PERCENT: 7.6 % (ref 0–4)
NUCLEATED RBC %: 0 %
PDW BLD-RTO: 13.3 % (ref 11.7–14.9)
PLATELET # BLD: 212 K/CU MM (ref 140–440)
PMV BLD AUTO: 11.6 FL (ref 7.5–11.1)
POTASSIUM SERPL-SCNC: 4.1 MMOL/L (ref 3.5–5.1)
RBC # BLD: 4.87 M/CU MM (ref 4.2–5.4)
SEGMENTED NEUTROPHILS ABSOLUTE COUNT: 3.2 K/CU MM
SEGMENTED NEUTROPHILS RELATIVE PERCENT: 63.8 % (ref 36–66)
SODIUM BLD-SCNC: 139 MMOL/L (ref 135–145)
TOTAL IMMATURE NEUTOROPHIL: 0.02 K/CU MM
TOTAL NUCLEATED RBC: 0 K/CU MM
TOTAL PROTEIN: 6.9 GM/DL (ref 6.4–8.2)
TRIGL SERPL-MCNC: 137 MG/DL
WBC # BLD: 5 K/CU MM (ref 4–10.5)

## 2020-01-03 PROCEDURE — 85025 COMPLETE CBC W/AUTO DIFF WBC: CPT

## 2020-01-03 PROCEDURE — 80061 LIPID PANEL: CPT

## 2020-01-03 PROCEDURE — 82248 BILIRUBIN DIRECT: CPT

## 2020-01-03 PROCEDURE — 83721 ASSAY OF BLOOD LIPOPROTEIN: CPT

## 2020-01-03 PROCEDURE — 80053 COMPREHEN METABOLIC PANEL: CPT

## 2020-01-08 ENCOUNTER — HOSPITAL ENCOUNTER (OUTPATIENT)
Dept: CT IMAGING | Age: 80
Discharge: HOME | End: 2020-01-08
Payer: MEDICARE

## 2020-01-08 PROCEDURE — 71260 CT THORAX DX C+: CPT

## 2020-01-08 PROCEDURE — 6360000004 HC RX CONTRAST MEDICATION: Performed by: NURSE PRACTITIONER

## 2020-01-08 RX ADMIN — IOPAMIDOL 75 ML: 755 INJECTION, SOLUTION INTRAVENOUS at 09:15

## 2020-01-10 ENCOUNTER — OFFICE VISIT (OUTPATIENT)
Dept: INTERNAL MEDICINE CLINIC | Age: 80
End: 2020-01-10
Payer: MEDICARE

## 2020-01-10 VITALS
WEIGHT: 176 LBS | HEART RATE: 80 BPM | SYSTOLIC BLOOD PRESSURE: 170 MMHG | DIASTOLIC BLOOD PRESSURE: 76 MMHG | BODY MASS INDEX: 31.18 KG/M2

## 2020-01-10 PROCEDURE — 99213 OFFICE O/P EST LOW 20 MIN: CPT | Performed by: FAMILY MEDICINE

## 2020-01-10 ASSESSMENT — PATIENT HEALTH QUESTIONNAIRE - PHQ9
2. FEELING DOWN, DEPRESSED OR HOPELESS: 0
SUM OF ALL RESPONSES TO PHQ QUESTIONS 1-9: 0
SUM OF ALL RESPONSES TO PHQ QUESTIONS 1-9: 0
1. LITTLE INTEREST OR PLEASURE IN DOING THINGS: 0
SUM OF ALL RESPONSES TO PHQ9 QUESTIONS 1 & 2: 0

## 2020-01-10 NOTE — PROGRESS NOTES
Subjective:      Chief Complaint: To discuss recent CT scan result    HPI:  Gonzales Watkins is a 78 y.o. female who presents today     Abnormal CT scan: Patient has x-ray chest taking chronic cough and and questionable mass was visible and follow-up CT scan shows thoracic paraspinal region mass 8.2 x 7.4 cm, thyroid goiter, lung/pleura 2.1 x 0.9 cm pleural based nodule. Extensively discussed the imaging results of x-ray and CT scan. Patient understand the complexity of the unknown pathology. She understands that she needs to see multiple specialists. Patient denies having any chest pain or shortness of breath. Patient continues to have back pain. X-ray chest 12/27/2019:  FINDINGS:   There is prominence to the right paratracheal stripe region on the AP   radiograph. Guerda Bishop is a questionable mass seen in this region on the lateral   radiograph I recommend CT scan of the chest be obtained to better evaluate. There DISH in appears to be a compression fracture of approximately the T6   vertebral body with loss of approximately 20% vertebral body height.       The lungs are clear there is no alveolar infiltrate effusion or pneumothorax. The cardiac silhouette is normal.  The remainder the osseous structures are   normal.     CT chest with contrast 1/8/2020:  FINDINGS:   Mediastinum: Calcification of the thoracic aorta.  Coronary artery   calcification.  A heterogeneous soft tissue mass is demonstrated within the   paraspinal regions bilaterally and posterior mediastinum, right greater than   left.  This density extends from approximately T4 through T8 levels and   measures approximately 8.2 x 7.4 cm in cross-section.  The mass blends   imperceptibly with the azygos vein.  A lesser degree of extrapleural soft   tissue thickening is seen at the inferomedial right hemithorax.       Thyroid goiter is demonstrated, with prominent extension of the inferior   right thyroid lobe to the right paratracheal region. Musculoskeletal: Normal range of motion. Right lower leg: No edema. Left lower leg: No edema. Comments: 5-5 muscular strength throughout and bilateral.  Tenderness at thoracic spine   Skin:     General: Skin is warm and dry. Neurological:      General: No focal deficit present. Mental Status: She is alert and oriented to person, place, and time. Psychiatric:         Mood and Affect: Mood normal.         Behavior: Behavior normal.         Thought Content: Thought content normal.         Judgment: Judgment normal.            Assessment / Plan:      1. Abnormal chest CT  - Extensively discussed CT scan result with the patient. All questions were answered at the time of encounter. We already provided patient with oncology consult. I also recommended seeing pulmonologist.  - Millicent Westfall MD, Pulmonology, [place]          Note:   Patient understand the assessment and agreed to the plan. Medication side effects was discussed during the encounter. Before discharging the patient, all questions and concern were addressed. After visit summary was provided. Advice to call clinic or me for any further questions or concern.        Romie Ferreira DO

## 2020-01-11 ASSESSMENT — ENCOUNTER SYMPTOMS
CONSTIPATION: 0
SORE THROAT: 0
DIARRHEA: 0
CHEST TIGHTNESS: 0
NAUSEA: 0
ABDOMINAL PAIN: 0
SINUS PAIN: 0
COUGH: 0
TROUBLE SWALLOWING: 0
WHEEZING: 0
EYE REDNESS: 0
ABDOMINAL DISTENTION: 0
BACK PAIN: 1
EYE ITCHING: 0
SHORTNESS OF BREATH: 0
VOMITING: 0

## 2020-01-17 ENCOUNTER — TELEPHONE (OUTPATIENT)
Dept: PULMONOLOGY | Age: 80
End: 2020-01-17

## 2020-01-17 ENCOUNTER — INITIAL CONSULT (OUTPATIENT)
Dept: PULMONOLOGY | Age: 80
End: 2020-01-17
Payer: MEDICARE

## 2020-01-17 VITALS
OXYGEN SATURATION: 96 % | SYSTOLIC BLOOD PRESSURE: 156 MMHG | HEART RATE: 86 BPM | WEIGHT: 168 LBS | BODY MASS INDEX: 29.77 KG/M2 | HEIGHT: 63 IN | DIASTOLIC BLOOD PRESSURE: 78 MMHG

## 2020-01-17 PROBLEM — S22.000A CLOSED COMPRESSION FRACTURE OF THORACIC VERTEBRA (HCC): Status: ACTIVE | Noted: 2020-01-17

## 2020-01-17 PROBLEM — R91.8 MASS OF RIGHT LUNG: Status: ACTIVE | Noted: 2020-01-17

## 2020-01-17 LAB
EXPIRATORY TIME-POST: NORMAL
EXPIRATORY TIME: NORMAL
FEF 25-75% %CHNG: NORMAL
FEF 25-75% %PRED-POST: NORMAL
FEF 25-75% %PRED-PRE: NORMAL
FEF 25-75% PRED: NORMAL
FEF 25-75%-POST: NORMAL
FEF 25-75%-PRE: NORMAL
FEV1 %PRED-POST: 114.8 %
FEV1 %PRED-PRE: 97.2 %
FEV1 PRED: 1.88 L
FEV1-POST: 2.16 L
FEV1-PRE: 1.83 L
FEV1/FVC %PRED-POST: 109.6 %
FEV1/FVC %PRED-PRE: 97.1 %
FEV1/FVC PRED: 74 %
FEV1/FVC-POST: 81.1 %
FEV1/FVC-PRE: 71.8 %
FVC %PRED-POST: 105.2 L
FVC %PRED-PRE: 100.5 %
FVC PRED: 2.53 L
FVC-POST: 2.66 L
FVC-PRE: 2.54 L
PEF %PRED-POST: NORMAL
PEF %PRED-PRE: NORMAL
PEF PRED: NORMAL
PEF%CHNG: NORMAL
PEF-POST: NORMAL
PEF-PRE: NORMAL

## 2020-01-17 PROCEDURE — 99204 OFFICE O/P NEW MOD 45 MIN: CPT | Performed by: INTERNAL MEDICINE

## 2020-01-17 PROCEDURE — 94060 EVALUATION OF WHEEZING: CPT | Performed by: INTERNAL MEDICINE

## 2020-01-17 ASSESSMENT — PULMONARY FUNCTION TESTS
FEV1_PRE: 1.83
FEV1/FVC_PRE: 71.8
FVC_PRE: 2.54
FEV1/FVC_PERCENT_PREDICTED_POST: 109.6
FEV1_PERCENT_PREDICTED_POST: 114.8
FVC_POST: 2.66
FEV1_PREDICTED: 1.88
FEV1/FVC_POST: 81.1
FEV1_PERCENT_PREDICTED_PRE: 97.2
FVC_PERCENT_PREDICTED_PRE: 100.5
FVC_PERCENT_PREDICTED_POST: 105.2
FEV1/FVC_PREDICTED: 74.0
FEV1_POST: 2.16
FEV1/FVC_PERCENT_PREDICTED_PRE: 97.1
FVC_PREDICTED: 2.53

## 2020-01-17 NOTE — TELEPHONE ENCOUNTER
[facility] for her [facility],. Arrival time is 8:30am, procedure at 10:30am. Jalil Rodrígeuz called and spoke to patient to inform of bx times today at 3:40 pm 1/17/2020.

## 2020-01-17 NOTE — PROGRESS NOTES
a significant response. 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 Allergies   Allergen Reactions    Adhesive Tape Other (See Comments) and Rash    Actonel [Risedronate Sodium]     Evista [Raloxifene Hydrochloride]     Fosamax [Alendronate Sodium]     Medrol [Methylprednisolone]      Medrol dosepak makes very jittery    Lansoprazole Rash    Penicillins Rash       Social History:    Social History     Socioeconomic History    Marital status:       Spouse name: None    Number of children: None    Years of education: None    Highest education level: None   Occupational History    Occupation: RETIRED TEACHER   Social Needs    Financial resource strain: None    Food insecurity:     Worry: None     Inability: None    Transportation needs:     Medical: None     Non-medical: None   Tobacco Use    Smoking status: Never Smoker    Smokeless tobacco: Never Used   Substance and Sexual Activity    Alcohol use: No     Comment: avoids caffeine    Drug use: No    Sexual activity: None   Lifestyle    Physical activity:     Days per week: None     Minutes per session: None    Stress: None   Relationships    wheezing or stridor over neck, no nodules felt in the area of her thyroid but thyroid does appear to be mildly enlarged  CHEST: chest expansion equal and symmetrical, no intercostal retraction, no increased work of breathing  LUNGS: Breath sounds are clear throughout all areas without wheezing or rhonchi  CARDIOVASCULAR: normal S1 and S2 , no murmurs or gallops ,no pericardial rubs  ABDOMEN:  normal bowel sounds, non-distended and no masses palpated, and no tenderness to palpation. No hepatosplenomegaly  LYMPHADENOPATHY:  no axillary or supraclavicular adenopathy. No cervical adenopathy  EXTREMITIES: no edema, no inflammation, no tenderness. NEURO: oriented X 3, No focal deficits  SKIN: no rashes, No lesions    RADIOLOGY: CT chest 1/8/2020   Impression   Heterogeneous 8 cm soft tissue mass is seen within the paraspinal region   along the midthoracic spine, for which primary or metastatic neoplasm are   considerations.  1 cm anterolateral right pleural nodule remote from the   primary mass is suspicious for metastasis.  PET-CT could be considered for   more complete evaluation.       Multinodular thyroid goiter, greatest on the right.  Thyroid ultrasound could   be performed for further characterization.       Age-indeterminate anterior wedging of T6 with associated sclerosis. Pathologic fracture can not be excluded.  An additional sclerotic lesion is   seen within T4, which can reflect bone island or metastasis.  Bone scan may   be helpful for further assessment     PFT: Office spirometry demonstrates an FEV1 of 1.83 L and an FVC of 2.54 L consistent with a mild obstructive defect. Following bronchodilators she did have a significant response. Assessment:     1. Mass of right lung    2. Closed compression fracture of thoracic vertebra, initial encounter ([facility] [address]        Plan:   I will schedule her for a needle biopsy of the right paraspinal lung mass as soon as possible. She also will be scheduled for a PET CT.

## 2020-01-19 ENCOUNTER — APPOINTMENT (OUTPATIENT)
Dept: CT IMAGING | Age: 80
DRG: 641 | End: 2020-01-19
Payer: MEDICARE

## 2020-01-19 ENCOUNTER — HOSPITAL ENCOUNTER (INPATIENT)
Age: 80
LOS: 2 days | Discharge: HOME | DRG: 641 | End: 2020-01-21
Attending: EMERGENCY MEDICINE | Admitting: INTERNAL MEDICINE
Payer: MEDICARE

## 2020-01-19 PROBLEM — E87.1 HYPONATREMIA: Status: ACTIVE | Noted: 2020-01-19

## 2020-01-19 LAB
ALBUMIN SERPL-MCNC: 4 GM/DL (ref 3.4–5)
ALP BLD-CCNC: 95 IU/L (ref 40–129)
ALT SERPL-CCNC: 27 U/L (ref 10–40)
ANION GAP SERPL CALCULATED.3IONS-SCNC: 14 MMOL/L (ref 4–16)
AST SERPL-CCNC: 33 IU/L (ref 15–37)
BASOPHILS ABSOLUTE: 0 K/CU MM
BASOPHILS RELATIVE PERCENT: 0.2 % (ref 0–1)
BILIRUB SERPL-MCNC: 0.6 MG/DL (ref 0–1)
BUN BLDV-MCNC: 9 MG/DL (ref 6–23)
CALCIUM SERPL-MCNC: 9.4 MG/DL (ref 8.3–10.6)
CHLORIDE BLD-SCNC: 84 MMOL/L (ref 99–110)
CO2: 25 MMOL/L (ref 21–32)
CREAT SERPL-MCNC: 0.5 MG/DL (ref 0.6–1.1)
DIFFERENTIAL TYPE: ABNORMAL
EOSINOPHILS ABSOLUTE: 0.1 K/CU MM
EOSINOPHILS RELATIVE PERCENT: 0.9 % (ref 0–3)
GFR AFRICAN AMERICAN: >60 ML/MIN/1.73M2
GFR NON-AFRICAN AMERICAN: >60 ML/MIN/1.73M2
GLUCOSE BLD-MCNC: 115 MG/DL (ref 70–99)
HCT VFR BLD CALC: 42.1 % (ref 37–47)
HEMOGLOBIN: 13.2 GM/DL (ref 12.5–16)
IMMATURE NEUTROPHIL %: 0.5 % (ref 0–0.43)
LIPASE: 58 IU/L (ref 13–60)
LYMPHOCYTES ABSOLUTE: 1.2 K/CU MM
LYMPHOCYTES RELATIVE PERCENT: 14.6 % (ref 24–44)
MCH RBC QN AUTO: 26.9 PG (ref 27–31)
MCHC RBC AUTO-ENTMCNC: 31.4 % (ref 32–36)
MCV RBC AUTO: 85.7 FL (ref 78–100)
MONOCYTES ABSOLUTE: 0.8 K/CU MM
MONOCYTES RELATIVE PERCENT: 8.9 % (ref 0–4)
NUCLEATED RBC %: 0 %
PDW BLD-RTO: 13.2 % (ref 11.7–14.9)
PLATELET # BLD: 268 K/CU MM (ref 140–440)
PMV BLD AUTO: 10.8 FL (ref 7.5–11.1)
POTASSIUM SERPL-SCNC: 3.8 MMOL/L (ref 3.5–5.1)
RBC # BLD: 4.91 M/CU MM (ref 4.2–5.4)
SEGMENTED NEUTROPHILS ABSOLUTE COUNT: 6.4 K/CU MM
SEGMENTED NEUTROPHILS RELATIVE PERCENT: 74.9 % (ref 36–66)
SODIUM BLD-SCNC: 123 MMOL/L (ref 135–145)
TOTAL IMMATURE NEUTOROPHIL: 0.04 K/CU MM
TOTAL NUCLEATED RBC: 0 K/CU MM
TOTAL PROTEIN: 7.5 GM/DL (ref 6.4–8.2)
TROPONIN T: <0.01 NG/ML
WBC # BLD: 8.5 K/CU MM (ref 4–10.5)

## 2020-01-19 PROCEDURE — 2580000003 HC RX 258: Performed by: EMERGENCY MEDICINE

## 2020-01-19 PROCEDURE — 84300 ASSAY OF URINE SODIUM: CPT

## 2020-01-19 PROCEDURE — 83935 ASSAY OF URINE OSMOLALITY: CPT

## 2020-01-19 PROCEDURE — 2580000003 HC RX 258: Performed by: NURSE PRACTITIONER

## 2020-01-19 PROCEDURE — 36415 COLL VENOUS BLD VENIPUNCTURE: CPT

## 2020-01-19 PROCEDURE — 83690 ASSAY OF LIPASE: CPT

## 2020-01-19 PROCEDURE — 94761 N-INVAS EAR/PLS OXIMETRY MLT: CPT

## 2020-01-19 PROCEDURE — 96361 HYDRATE IV INFUSION ADD-ON: CPT

## 2020-01-19 PROCEDURE — 6370000000 HC RX 637 (ALT 250 FOR IP): Performed by: NURSE PRACTITIONER

## 2020-01-19 PROCEDURE — 6360000002 HC RX W HCPCS: Performed by: NURSE PRACTITIONER

## 2020-01-19 PROCEDURE — 6360000002 HC RX W HCPCS: Performed by: PHYSICIAN ASSISTANT

## 2020-01-19 PROCEDURE — 71275 CT ANGIOGRAPHY CHEST: CPT

## 2020-01-19 PROCEDURE — 84484 ASSAY OF TROPONIN QUANT: CPT

## 2020-01-19 PROCEDURE — 96374 THER/PROPH/DIAG INJ IV PUSH: CPT

## 2020-01-19 PROCEDURE — 99285 EMERGENCY DEPT VISIT HI MDM: CPT

## 2020-01-19 PROCEDURE — 93005 ELECTROCARDIOGRAM TRACING: CPT | Performed by: PHYSICIAN ASSISTANT

## 2020-01-19 PROCEDURE — 6360000004 HC RX CONTRAST MEDICATION: Performed by: EMERGENCY MEDICINE

## 2020-01-19 PROCEDURE — 1200000000 HC SEMI PRIVATE

## 2020-01-19 PROCEDURE — 80053 COMPREHEN METABOLIC PANEL: CPT

## 2020-01-19 PROCEDURE — 85025 COMPLETE CBC W/AUTO DIFF WBC: CPT

## 2020-01-19 PROCEDURE — 74177 CT ABD & PELVIS W/CONTRAST: CPT

## 2020-01-19 RX ORDER — SODIUM CHLORIDE 9 MG/ML
INJECTION, SOLUTION INTRAVENOUS CONTINUOUS
Status: DISCONTINUED | OUTPATIENT
Start: 2020-01-20 | End: 2020-01-20

## 2020-01-19 RX ORDER — POLYETHYLENE GLYCOL 3350 17 G/17G
17 POWDER, FOR SOLUTION ORAL DAILY
Status: DISCONTINUED | OUTPATIENT
Start: 2020-01-20 | End: 2020-01-21 | Stop reason: HOSPADM

## 2020-01-19 RX ORDER — KETOROLAC TROMETHAMINE 30 MG/ML
15 INJECTION, SOLUTION INTRAMUSCULAR; INTRAVENOUS EVERY 6 HOURS PRN
Status: DISCONTINUED | OUTPATIENT
Start: 2020-01-19 | End: 2020-01-20

## 2020-01-19 RX ORDER — SODIUM CHLORIDE 0.9 % (FLUSH) 0.9 %
10 SYRINGE (ML) INJECTION 2 TIMES DAILY
Status: DISCONTINUED | OUTPATIENT
Start: 2020-01-20 | End: 2020-01-21 | Stop reason: HOSPADM

## 2020-01-19 RX ORDER — ACETAMINOPHEN 325 MG/1
650 TABLET ORAL EVERY 4 HOURS PRN
Status: DISCONTINUED | OUTPATIENT
Start: 2020-01-19 | End: 2020-01-21 | Stop reason: HOSPADM

## 2020-01-19 RX ORDER — 0.9 % SODIUM CHLORIDE 0.9 %
1000 INTRAVENOUS SOLUTION INTRAVENOUS ONCE
Status: COMPLETED | OUTPATIENT
Start: 2020-01-19 | End: 2020-01-19

## 2020-01-19 RX ORDER — METOPROLOL SUCCINATE 50 MG/1
50 TABLET, EXTENDED RELEASE ORAL DAILY
Status: DISCONTINUED | OUTPATIENT
Start: 2020-01-20 | End: 2020-01-21 | Stop reason: HOSPADM

## 2020-01-19 RX ORDER — FAMOTIDINE 20 MG/1
20 TABLET, FILM COATED ORAL 2 TIMES DAILY
Status: DISCONTINUED | OUTPATIENT
Start: 2020-01-20 | End: 2020-01-21 | Stop reason: HOSPADM

## 2020-01-19 RX ORDER — GABAPENTIN 300 MG/1
300 CAPSULE ORAL 2 TIMES DAILY
Status: DISCONTINUED | OUTPATIENT
Start: 2020-01-20 | End: 2020-01-21 | Stop reason: HOSPADM

## 2020-01-19 RX ORDER — HYDROCODONE BITARTRATE AND ACETAMINOPHEN 5; 325 MG/1; MG/1
1 TABLET ORAL EVERY 6 HOURS PRN
Status: CANCELLED | OUTPATIENT
Start: 2020-01-19

## 2020-01-19 RX ORDER — MORPHINE SULFATE 4 MG/ML
4 INJECTION, SOLUTION INTRAMUSCULAR; INTRAVENOUS EVERY 4 HOURS PRN
Status: DISCONTINUED | OUTPATIENT
Start: 2020-01-19 | End: 2020-01-21 | Stop reason: HOSPADM

## 2020-01-19 RX ORDER — LISINOPRIL 10 MG/1
10 TABLET ORAL DAILY
Status: DISCONTINUED | OUTPATIENT
Start: 2020-01-20 | End: 2020-01-21 | Stop reason: HOSPADM

## 2020-01-19 RX ORDER — TRAMADOL HYDROCHLORIDE 50 MG/1
50 TABLET ORAL EVERY 6 HOURS PRN
Status: DISCONTINUED | OUTPATIENT
Start: 2020-01-19 | End: 2020-01-21 | Stop reason: HOSPADM

## 2020-01-19 RX ORDER — DOCUSATE SODIUM 100 MG/1
100 CAPSULE, LIQUID FILLED ORAL DAILY
Status: DISCONTINUED | OUTPATIENT
Start: 2020-01-19 | End: 2020-01-21 | Stop reason: HOSPADM

## 2020-01-19 RX ORDER — SODIUM CHLORIDE 0.9 % (FLUSH) 0.9 %
10 SYRINGE (ML) INJECTION PRN
Status: DISCONTINUED | OUTPATIENT
Start: 2020-01-19 | End: 2020-01-21 | Stop reason: HOSPADM

## 2020-01-19 RX ORDER — SENNA PLUS 8.6 MG/1
1 TABLET ORAL NIGHTLY
Status: DISCONTINUED | OUTPATIENT
Start: 2020-01-20 | End: 2020-01-21 | Stop reason: HOSPADM

## 2020-01-19 RX ORDER — ROPINIROLE 1 MG/1
1 TABLET, FILM COATED ORAL NIGHTLY
Status: DISCONTINUED | OUTPATIENT
Start: 2020-01-20 | End: 2020-01-20

## 2020-01-19 RX ORDER — HYDROCHLOROTHIAZIDE 25 MG/1
25 TABLET ORAL DAILY
Status: DISCONTINUED | OUTPATIENT
Start: 2020-01-20 | End: 2020-01-20

## 2020-01-19 RX ORDER — MORPHINE SULFATE 4 MG/ML
4 INJECTION, SOLUTION INTRAMUSCULAR; INTRAVENOUS
Status: DISCONTINUED | OUTPATIENT
Start: 2020-01-19 | End: 2020-01-19

## 2020-01-19 RX ORDER — ONDANSETRON 2 MG/ML
4 INJECTION INTRAMUSCULAR; INTRAVENOUS EVERY 6 HOURS PRN
Status: DISCONTINUED | OUTPATIENT
Start: 2020-01-19 | End: 2020-01-21 | Stop reason: HOSPADM

## 2020-01-19 RX ADMIN — FAMOTIDINE 20 MG: 20 TABLET ORAL at 23:40

## 2020-01-19 RX ADMIN — SODIUM CHLORIDE, PRESERVATIVE FREE 10 ML: 5 INJECTION INTRAVENOUS at 23:45

## 2020-01-19 RX ADMIN — IOPAMIDOL 75 ML: 755 INJECTION, SOLUTION INTRAVENOUS at 19:45

## 2020-01-19 RX ADMIN — SODIUM CHLORIDE: 9 INJECTION, SOLUTION INTRAVENOUS at 23:43

## 2020-01-19 RX ADMIN — MORPHINE SULFATE 4 MG: 4 INJECTION, SOLUTION INTRAMUSCULAR; INTRAVENOUS at 19:37

## 2020-01-19 RX ADMIN — GABAPENTIN 300 MG: 300 CAPSULE ORAL at 23:39

## 2020-01-19 RX ADMIN — ROPINIROLE HYDROCHLORIDE 1 MG: 1 TABLET, FILM COATED ORAL at 23:39

## 2020-01-19 RX ADMIN — MORPHINE SULFATE 4 MG: 4 INJECTION, SOLUTION INTRAMUSCULAR; INTRAVENOUS at 23:40

## 2020-01-19 RX ADMIN — SODIUM CHLORIDE 1000 ML: 9 INJECTION, SOLUTION INTRAVENOUS at 19:37

## 2020-01-19 RX ADMIN — DOCUSATE SODIUM 100 MG: 100 CAPSULE, LIQUID FILLED ORAL at 23:39

## 2020-01-19 RX ADMIN — SENNOSIDES 8.6 MG: 8.6 TABLET, FILM COATED ORAL at 23:39

## 2020-01-19 ASSESSMENT — PAIN DESCRIPTION - PAIN TYPE
TYPE: ACUTE PAIN

## 2020-01-19 ASSESSMENT — PAIN DESCRIPTION - DESCRIPTORS
DESCRIPTORS: ACHING;SPASM
DESCRIPTORS: ACHING;SPASM

## 2020-01-19 ASSESSMENT — PAIN DESCRIPTION - LOCATION
LOCATION: ABDOMEN
LOCATION: BACK;NOSE
LOCATION: BACK

## 2020-01-19 ASSESSMENT — PAIN DESCRIPTION - ONSET: ONSET: ON-GOING

## 2020-01-19 ASSESSMENT — ENCOUNTER SYMPTOMS
VOMITING: 0
BACK PAIN: 0
NAUSEA: 0
RHINORRHEA: 0
SORE THROAT: 0
ABDOMINAL PAIN: 1
CONSTIPATION: 1
SHORTNESS OF BREATH: 0
COUGH: 0
DIARRHEA: 0
EYE REDNESS: 0

## 2020-01-19 ASSESSMENT — PAIN SCALES - GENERAL
PAINLEVEL_OUTOF10: 7
PAINLEVEL_OUTOF10: 9
PAINLEVEL_OUTOF10: 8
PAINLEVEL_OUTOF10: 9

## 2020-01-19 ASSESSMENT — PAIN DESCRIPTION - ORIENTATION
ORIENTATION: RIGHT;LEFT
ORIENTATION: RIGHT;LEFT

## 2020-01-19 ASSESSMENT — PAIN DESCRIPTION - FREQUENCY
FREQUENCY: INTERMITTENT
FREQUENCY: INTERMITTENT

## 2020-01-19 NOTE — ED PROVIDER NOTES
As triage Physician Assistant, I performed a medical screening history and physical exam on this patient. HISTORY OF PRESENT ILLNESS  Collette Cardona is a 78 y.o. female  who presents with LUQ abdominal pain and LEFT low chest pain. Pt states pain feels deep. Pt was recently diagnosed with CA (unknown) type and is currently awaiting [facility] next week. Pt's family member states that the cancer is \"all over\" regarding the torso. Chart review reveald:  CT chest w/ contrast on 1/8/2020     FINDINGS:   Mediastinum: Calcification of the thoracic aorta.  Coronary artery   calcification.  A heterogeneous soft tissue mass is demonstrated within the   paraspinal regions bilaterally and posterior mediastinum, right greater than   left.  This density extends from approximately T4 through T8 levels and   measures approximately 8.2 x 7.4 cm in cross-section.  The mass blends   imperceptibly with the azygos vein.  A lesser degree of extrapleural soft   tissue thickening is seen at the inferomedial right hemithorax.       Thyroid goiter is demonstrated, with prominent extension of the inferior   right thyroid lobe to the right paratracheal region.  Portions of the lobular   thyroid are calcified.  Portion which is seen within the right paratracheal   region measures approximately 2.5 x 2.0 cm.  No axillary adenopathy.       Lungs/pleura: 2.1 x 0.9 cm pleural based nodule is seen along the   anterolateral right hemithorax.  Scattered ground-glass and linear opacity,   likely atelectasis.  2 mm posterior left apical pulmonary nodule, too small   to characterize.  Calcified right upper lobe nodule, compatible with   granuloma.  No pneumothorax.       Upper Abdomen: 2.9 x 2.9 cm fluid density cyst is seen along the falciform.    5 mm low-density lesions within the liver are too small to accurately   characterize.  Liver is fatty.  Status post cholecystectomy.       Soft Tissues/Bones: Anterior wedging of T6 vertebral body with associated   sclerosis.  Small focus of sclerosis is seen within T4. PHYSICAL EXAM  BP (!) 144/96   Pulse 87   Temp 98 °F (36.7 °C)   Resp 23   Ht 5' 3\" (1.6 m)   Wt 168 lb (76.2 kg)   SpO2 96%   BMI 29.76 kg/m²         On exam, the patient appears well-hydrated, well-nourished, and in no acute distress. Mucous membranes are moist. Breathing is unlabored. Skin is dry. Mental status appears normal. Moves all extremities, and is without facial droop. Speech is clear. Any necessary Labs, Imaging, and/or treatment were initiated and patient moved to an emergency department exam room. Comment: Please note this report has been produced using speech recognition software and may contain errors related to that system including errors in grammar, punctuation, and spelling, as well as words and phrases that may be inappropriate. If there are any questions or concerns please feel free to contact the dictating provider for clarification.        Zoey Hayward  01/19/20 2016

## 2020-01-19 NOTE — ED PROVIDER NOTES
1/17/2020    Osteopenia     Screening colonoscopy     diverticulosis,small internal hemorrhoids, otherwise normal-10year follow up recommended by Dr Lala Hall     Past Surgical History:   Procedure Laterality Date    CHOLECYSTECTOMY  2006    COLONOSCOPY  7/6/2015    diverticulosis, polyp removal, internal hemorrhoids    HIP SURGERY Left 11/01/2019    LUMBAR LAMINECTOMY  1990    L4-5 herniated disc resection    TUBAL LIGATION  1974     Family History   Problem Relation Age of Onset    Stroke Mother     Heart Failure Mother         d. age 80 -CHF    Diabetes Father         d. age 80    Other Brother         Lior-Parkison-White syndrome     Social History     Socioeconomic History    Marital status:       Spouse name: Not on file    Number of children: Not on file    Years of education: Not on file    Highest education level: Not on file   Occupational History    Occupation: RETIRED TEACHER   Social Needs    Financial resource strain: Not on file    Food insecurity:     Worry: Not on file     Inability: Not on file    Transportation needs:     Medical: Not on file     Non-medical: Not on file   Tobacco Use    Smoking status: Never Smoker    Smokeless tobacco: Never Used   Substance and Sexual Activity    Alcohol use: No     Comment: avoids caffeine    Drug use: No    Sexual activity: Not on file   Lifestyle    Physical activity:     Days per week: Not on file     Minutes per session: Not on file    Stress: Not on file   Relationships    Social connections:     Talks on phone: Not on file     Gets together: Not on file     Attends [employer] service: Not on file     Active member of club or organization: Not on file     Attends meetings of clubs or organizations: Not on file     Relationship status: Not on file    Intimate partner violence:     Fear of current or ex partner: Not on file     Emotionally abused: Not on file     Physically abused: Not on file     Forced sexual activity: Not on file   Other Topics Concern    Not on file   Social History Narrative    Not on file     Current Facility-Administered Medications   Medication Dose Route Frequency Provider Last Rate Last Dose    morphine sulfate (PF) injection 4 mg  4 mg Intravenous Q20 Min PRN Nely Cha, [address]   4 mg at 01/19/20 0966     Current Outpatient Medications   Medication Sig Dispense Refill    famotidine (PEPCID) 20 MG tablet Take 1 tablet by mouth 2 times daily 60 tablet 0    tiZANidine (ZANAFLEX) 2 MG tablet Take 1 tablet by mouth 2 times daily (Patient not taking: Reported on 1/17/2020) 30 tablet 0    Multiple Vitamins-Minerals (MULTIVITAMIN ADULT EXTRA C PO) Take 1 tablet by mouth daily      traMADol (ULTRAM) 50 MG tablet Take 50 mg by mouth every 6 hours as needed for Pain. 200 mg in the morning, 100 at 5 pm, and 50 or 100 at bedtime      indapamide (LOZOL) 1.25 MG tablet TAKE 1 TABLET EVERY MORNING 90 tablet 3    gabapentin (NEURONTIN) 300 MG capsule TAKE 3 CAPSULES DAILY AT 5:00 P. M. AND TAKE 3 CAPSULES AT BEDTIME (Patient taking differently: 300 mg. Take 1 in the morning, 1 at [address], and 1 at Bedtime) 540 capsule 1    diclofenac sodium 1 % GEL       lisinopril (PRINIVIL;ZESTRIL) 10 MG tablet TAKE 1 TABLET DAILY IN THE EVENING FOR BLOOD PRESSURE 90 tablet 3    rOPINIRole (REQUIP) 1 MG tablet TAKE ONE AND ONE-HALF TABLETS AT NIGHT 150 tablet 3    metoprolol succinate (TOPROL XL) 50 MG extended release tablet TAKE 1 TABLET DAILY 90 tablet 3    Cholecalciferol (VITAMIN D3) 1000 UNITS CAPS Take  by mouth daily.  omeprazole (PRILOSEC) 20 MG capsule Take 1 capsule by mouth 2 times daily.  (Patient not taking: Reported on 1/17/2020) 60 capsule 11     Allergies   Allergen Reactions    Adhesive Tape Other (See Comments) and Rash    Actonel [Risedronate Sodium]     Evista [Raloxifene Hydrochloride]     Fosamax [Alendronate Sodium]     Medrol [Methylprednisolone]      Medrol dosepak makes very variant  Borderline ECG  No previous ECGs available        Radiographs (if obtained):  [] The following radiograph was interpreted by myself in the absence of a radiologist:  [x]Radiologist's Report Reviewed:  CTA PULMONARY W CONTRAST   Final Result   No findings diagnostic of pulmonary embolus      Large bilateral paraspinal mass in the chest, greatest right of midline. There is associated pleural thickening extending from the apex to the lung   base. Additional smaller separate areas of pleural thickening on the right are   noted as well. This is likely due to a large neoplasm. Biopsy and PET scan   recommended. Stable compression fracture in the midthoracic spine with patchy sclerosis. This is age indeterminate and certainly could be related to the tumor. MRI   of the thoracic spine would be more helpful in this regard. MRI would also   help to better evaluate for extension into the canal.         CT ABDOMEN PELVIS W IV CONTRAST   Preliminary Result   1. No acute findings within the abdomen or pelvis. Colonic diverticulosis   with no acute features. 2. Bilateral pyelocaliectasis, likely mild bilateral UPJ obstructions. No   evidence of obstructive uropathy. 3. See CT of the chest of the same date for findings related to the   paraspinal mass in the lower chest.               EKG (if obtained): (All EKG's are interpreted by myself in the absence of a cardiologist)  Normal sinus rhythm with a rate of 78. AL interval 142, QRS 74, QTc 469. No ST elevations or depressions. Normal T waves. Criteria met for left ventricular hypertrophy. Impression: Nonspecific EKG. When compared to previous EKG from 9/3/2019, there are no significant changes. MDM:  Differential diagnoses considered include gastritis, pancreatitis, colitis, muscle strain, muscle spasm, pain due to new cancer, constipation, pulmonary embolism.     Labs were obtained and concerning for significant hyponatremia but are otherwise unremarkable. Patient started on IV fluids. Lipase is within normal limits, I do not suspect pancreatitis. EKG is not concerning for acute ischemia. Troponin is negative. I do not suspect acute coronary syndrome. CT scan of her chest and abdomen show no acute abnormalities but it does again show the large mass in her lungs. No pulmonary embolism. I do not suspect a new emergent cause of patient's current pain. Patient's pain is improved after pain medication. Given her significant hyponatremia, plan for admission to the hospital overnight for observation and further evaluation and treatment of the hyponatremia. I spoke with PATRICIA Kwok for hosptialist service, who graciously agreed to admit the patient. Plan of care explained to patient. All questions and concerns were addressed to the patient's satisfaction. Patient understood and agreed with plan. Clinical Impression:  1. Hyponatremia    2. Lung mass          Norma Bermudez MD       Please note that portions of this note may have been complete with a voice recognition program.  Effortswere made to edit the dictations, but occasional words are mis-transcribed.           Norma Bermudez MD  01/19/20 1553

## 2020-01-19 NOTE — ED TRIAGE NOTES
Patient to ER for c/o LUQ abd pain. .. states recent dx of cancer. Unknown extent or placement, per family. Patient denies N/V/D, reports increased pain starting today.

## 2020-01-20 LAB
ANION GAP SERPL CALCULATED.3IONS-SCNC: 12 MMOL/L (ref 4–16)
ANION GAP SERPL CALCULATED.3IONS-SCNC: 13 MMOL/L (ref 4–16)
ANION GAP SERPL CALCULATED.3IONS-SCNC: 16 MMOL/L (ref 4–16)
BASOPHILS ABSOLUTE: 0 K/CU MM
BASOPHILS RELATIVE PERCENT: 0.3 % (ref 0–1)
BUN BLDV-MCNC: 10 MG/DL (ref 6–23)
BUN BLDV-MCNC: 8 MG/DL (ref 6–23)
BUN BLDV-MCNC: 9 MG/DL (ref 6–23)
CALCIUM SERPL-MCNC: 9.2 MG/DL (ref 8.3–10.6)
CALCIUM SERPL-MCNC: 9.3 MG/DL (ref 8.3–10.6)
CALCIUM SERPL-MCNC: 9.6 MG/DL (ref 8.3–10.6)
CHLORIDE BLD-SCNC: 91 MMOL/L (ref 99–110)
CHLORIDE BLD-SCNC: 91 MMOL/L (ref 99–110)
CHLORIDE BLD-SCNC: 94 MMOL/L (ref 99–110)
CO2: 24 MMOL/L (ref 21–32)
CO2: 26 MMOL/L (ref 21–32)
CO2: 26 MMOL/L (ref 21–32)
CREAT SERPL-MCNC: 0.5 MG/DL (ref 0.6–1.1)
CREAT SERPL-MCNC: 0.5 MG/DL (ref 0.6–1.1)
CREAT SERPL-MCNC: 0.8 MG/DL (ref 0.6–1.1)
DIFFERENTIAL TYPE: ABNORMAL
EOSINOPHILS ABSOLUTE: 0.1 K/CU MM
EOSINOPHILS RELATIVE PERCENT: 0.9 % (ref 0–3)
GFR AFRICAN AMERICAN: >60 ML/MIN/1.73M2
GFR NON-AFRICAN AMERICAN: >60 ML/MIN/1.73M2
GLUCOSE BLD-MCNC: 115 MG/DL (ref 70–99)
GLUCOSE BLD-MCNC: 118 MG/DL (ref 70–99)
GLUCOSE BLD-MCNC: 144 MG/DL (ref 70–99)
HCT VFR BLD CALC: 40 % (ref 37–47)
HEMOGLOBIN: 12.4 GM/DL (ref 12.5–16)
IMMATURE NEUTROPHIL %: 0.6 % (ref 0–0.43)
LYMPHOCYTES ABSOLUTE: 1.2 K/CU MM
LYMPHOCYTES RELATIVE PERCENT: 18.4 % (ref 24–44)
MCH RBC QN AUTO: 26.6 PG (ref 27–31)
MCHC RBC AUTO-ENTMCNC: 31 % (ref 32–36)
MCV RBC AUTO: 85.7 FL (ref 78–100)
MONOCYTES ABSOLUTE: 0.7 K/CU MM
MONOCYTES RELATIVE PERCENT: 10.5 % (ref 0–4)
NUCLEATED RBC %: 0 %
OSMOLALITY URINE: 323 MOS/L (ref 292–1090)
OSMOLALITY: 282 MOS/L (ref 280–300)
PDW BLD-RTO: 13.3 % (ref 11.7–14.9)
PLATELET # BLD: 212 K/CU MM (ref 140–440)
PMV BLD AUTO: 10.9 FL (ref 7.5–11.1)
POTASSIUM SERPL-SCNC: 4.1 MMOL/L (ref 3.5–5.1)
POTASSIUM SERPL-SCNC: 4.1 MMOL/L (ref 3.5–5.1)
POTASSIUM SERPL-SCNC: 4.6 MMOL/L (ref 3.5–5.1)
RBC # BLD: 4.67 M/CU MM (ref 4.2–5.4)
SEGMENTED NEUTROPHILS ABSOLUTE COUNT: 4.5 K/CU MM
SEGMENTED NEUTROPHILS RELATIVE PERCENT: 69.3 % (ref 36–66)
SODIUM BLD-SCNC: 129 MMOL/L (ref 135–145)
SODIUM BLD-SCNC: 131 MMOL/L (ref 135–145)
SODIUM BLD-SCNC: 133 MMOL/L (ref 135–145)
SODIUM URINE: 50 MMOL/L (ref 35–167)
TOTAL IMMATURE NEUTOROPHIL: 0.04 K/CU MM
TOTAL NUCLEATED RBC: 0 K/CU MM
WBC # BLD: 6.5 K/CU MM (ref 4–10.5)

## 2020-01-20 PROCEDURE — 83930 ASSAY OF BLOOD OSMOLALITY: CPT

## 2020-01-20 PROCEDURE — 93010 ELECTROCARDIOGRAM REPORT: CPT | Performed by: INTERNAL MEDICINE

## 2020-01-20 PROCEDURE — 94761 N-INVAS EAR/PLS OXIMETRY MLT: CPT

## 2020-01-20 PROCEDURE — 6360000002 HC RX W HCPCS: Performed by: NURSE PRACTITIONER

## 2020-01-20 PROCEDURE — 36415 COLL VENOUS BLD VENIPUNCTURE: CPT

## 2020-01-20 PROCEDURE — 6370000000 HC RX 637 (ALT 250 FOR IP): Performed by: NURSE PRACTITIONER

## 2020-01-20 PROCEDURE — 85025 COMPLETE CBC W/AUTO DIFF WBC: CPT

## 2020-01-20 PROCEDURE — 1200000000 HC SEMI PRIVATE

## 2020-01-20 PROCEDURE — 80048 BASIC METABOLIC PNL TOTAL CA: CPT

## 2020-01-20 PROCEDURE — 2580000003 HC RX 258: Performed by: NURSE PRACTITIONER

## 2020-01-20 PROCEDURE — 2580000003 HC RX 258: Performed by: INTERNAL MEDICINE

## 2020-01-20 PROCEDURE — 6370000000 HC RX 637 (ALT 250 FOR IP): Performed by: STUDENT IN AN ORGANIZED HEALTH CARE EDUCATION/TRAINING PROGRAM

## 2020-01-20 RX ORDER — LIDOCAINE 4 G/G
1 PATCH TOPICAL DAILY
Status: DISCONTINUED | OUTPATIENT
Start: 2020-01-20 | End: 2020-01-21 | Stop reason: HOSPADM

## 2020-01-20 RX ORDER — OYSTER SHELL CALCIUM WITH VITAMIN D 500; 200 MG/1; [IU]/1
2 TABLET, FILM COATED ORAL DAILY
Status: ON HOLD | COMMUNITY
End: 2020-01-21 | Stop reason: HOSPADM

## 2020-01-20 RX ORDER — DEXTROSE MONOHYDRATE 50 MG/ML
INJECTION, SOLUTION INTRAVENOUS CONTINUOUS
Status: DISCONTINUED | OUTPATIENT
Start: 2020-01-20 | End: 2020-01-20

## 2020-01-20 RX ADMIN — SODIUM CHLORIDE, PRESERVATIVE FREE 10 ML: 5 INJECTION INTRAVENOUS at 21:39

## 2020-01-20 RX ADMIN — DOCUSATE SODIUM 100 MG: 100 CAPSULE, LIQUID FILLED ORAL at 10:21

## 2020-01-20 RX ADMIN — DEXTROSE MONOHYDRATE: 50 INJECTION, SOLUTION INTRAVENOUS at 06:47

## 2020-01-20 RX ADMIN — SODIUM CHLORIDE, PRESERVATIVE FREE 10 ML: 5 INJECTION INTRAVENOUS at 10:22

## 2020-01-20 RX ADMIN — LISINOPRIL 10 MG: 10 TABLET ORAL at 21:38

## 2020-01-20 RX ADMIN — SERTRALINE HYDROCHLORIDE 25 MG: 50 TABLET ORAL at 21:39

## 2020-01-20 RX ADMIN — LISINOPRIL 10 MG: 10 TABLET ORAL at 00:10

## 2020-01-20 RX ADMIN — MORPHINE SULFATE 4 MG: 4 INJECTION, SOLUTION INTRAMUSCULAR; INTRAVENOUS at 15:56

## 2020-01-20 RX ADMIN — TRAMADOL HYDROCHLORIDE 50 MG: 50 TABLET, FILM COATED ORAL at 19:45

## 2020-01-20 RX ADMIN — METOPROLOL SUCCINATE 50 MG: 50 TABLET, EXTENDED RELEASE ORAL at 10:21

## 2020-01-20 RX ADMIN — FAMOTIDINE 20 MG: 20 TABLET ORAL at 10:22

## 2020-01-20 RX ADMIN — GABAPENTIN 300 MG: 300 CAPSULE ORAL at 10:21

## 2020-01-20 RX ADMIN — MORPHINE SULFATE 4 MG: 4 INJECTION, SOLUTION INTRAMUSCULAR; INTRAVENOUS at 10:22

## 2020-01-20 RX ADMIN — FAMOTIDINE 20 MG: 20 TABLET ORAL at 21:39

## 2020-01-20 RX ADMIN — GABAPENTIN 300 MG: 300 CAPSULE ORAL at 21:39

## 2020-01-20 RX ADMIN — MORPHINE SULFATE 4 MG: 4 INJECTION, SOLUTION INTRAMUSCULAR; INTRAVENOUS at 05:36

## 2020-01-20 RX ADMIN — SENNOSIDES 8.6 MG: 8.6 TABLET, FILM COATED ORAL at 21:38

## 2020-01-20 RX ADMIN — ENOXAPARIN SODIUM 40 MG: 40 INJECTION SUBCUTANEOUS at 10:22

## 2020-01-20 RX ADMIN — MELATONIN 10 MG: at 21:44

## 2020-01-20 RX ADMIN — KETOROLAC TROMETHAMINE 15 MG: 30 INJECTION, SOLUTION INTRAMUSCULAR at 00:54

## 2020-01-20 RX ADMIN — POLYETHYLENE GLYCOL (3350) 17 G: 17 POWDER, FOR SOLUTION ORAL at 10:22

## 2020-01-20 RX ADMIN — ROPINIROLE HYDROCHLORIDE 1.5 MG: 1 TABLET, FILM COATED ORAL at 21:38

## 2020-01-20 ASSESSMENT — PAIN DESCRIPTION - DESCRIPTORS
DESCRIPTORS: SHARP
DESCRIPTORS: ACHING;SPASM
DESCRIPTORS: ACHING;SPASM

## 2020-01-20 ASSESSMENT — PAIN DESCRIPTION - ONSET
ONSET: ON-GOING
ONSET: ON-GOING

## 2020-01-20 ASSESSMENT — PAIN DESCRIPTION - PAIN TYPE
TYPE: ACUTE PAIN
TYPE: ACUTE PAIN
TYPE: ACUTE PAIN;CHRONIC PAIN

## 2020-01-20 ASSESSMENT — PAIN SCALES - GENERAL
PAINLEVEL_OUTOF10: 5
PAINLEVEL_OUTOF10: 0
PAINLEVEL_OUTOF10: 7
PAINLEVEL_OUTOF10: 8
PAINLEVEL_OUTOF10: 6
PAINLEVEL_OUTOF10: 7
PAINLEVEL_OUTOF10: 6
PAINLEVEL_OUTOF10: 4

## 2020-01-20 ASSESSMENT — PAIN DESCRIPTION - FREQUENCY
FREQUENCY: INTERMITTENT
FREQUENCY: CONTINUOUS
FREQUENCY: INTERMITTENT

## 2020-01-20 ASSESSMENT — PAIN DESCRIPTION - PROGRESSION
CLINICAL_PROGRESSION: NOT CHANGED

## 2020-01-20 ASSESSMENT — PAIN DESCRIPTION - LOCATION
LOCATION: BACK

## 2020-01-20 ASSESSMENT — PAIN DESCRIPTION - ORIENTATION
ORIENTATION: LOWER
ORIENTATION: RIGHT;LEFT;MID;LOWER
ORIENTATION: LEFT;RIGHT

## 2020-01-20 ASSESSMENT — PAIN - FUNCTIONAL ASSESSMENT: PAIN_FUNCTIONAL_ASSESSMENT: ACTIVITIES ARE NOT PREVENTED

## 2020-01-20 NOTE — H&P
History and Physical      Name:  Bee Carias /Age/Sex: [birth year]  (75 y.o. female)   MRN & CSN:  [id number] & [id number] Admission Date/Time: 2020  6:24 PM   Location:  ED30/ED-30 PCP: Maribel Guerrier DO       Discussed patient with Dr. Fany Warren and Plan:     Bee Carias is a 78 y.o.  female  who presents with LUQ pain    - Hyponatremia, acute  Na 123  Likely 2/2 to lung/paraspinal mass; ? SCLC  Check Urine osmo, serum osmo, urine Na  NS 0.9 at 30/hr   Q 4 BMP; not to increase Na more than 0.5 meq/hr  If serum Na decreases, RN to consult nephro and notify night hospitalist     - Abdominal pain  CT a/p without acute process   CTA PUL without PE   ? Constipation - 2 days; no n/v, diarrhea--start bowel regimen  Trop negative    - Paraspinal/Lung mass  Recently dx; has seen PULM Dr Nel Delacruz  Has needle bx and PET scan this week  [facility]     - Stable compression fx  Mid-thoracic; RAD recommends MRI T spine  ? 2/2 mass  No s/sx of cauda equina  Pain control       Chronic  - HTN- cont bb, acei, lozol    Discussed patient with ER physician     Diet General    DVT Prophylaxis [x] Lovenox, []  Heparin, [] SCDs, [] Ambulation   GI Prophylaxis [] PPI,  [x] H2 Blocker,  [] Carafate,  [] Diet/Tube Feeds   Code Status Full   Disposition Patient requires continued admission due to low sodium; pain control   MDM [] Low, [x] Moderate,[]  High  Patient's risk as above due to      [] One or more chronic illnesses with exacerbation progression      [x] Two or more stable chronic illnesses      [x] Undiagnosed new problem with uncertain prognosis      [] Elective major surgery      []Prescription drug management       History of Present Illness:     Chief Complaint: Abdominal pain    Bee Carias is a 78 y.o.  female  who presents with the above complaints, onset over the last several days. Context is, patient recently diagnosed (2019) with lung/paraspinal mass.   She saw Marcia Barclay Dr Jn, 1/17 and has her first appt with ONC, Dr Alexa Rajput, scheduled for this upcoming week. She denies n/v, diarrhea, chest pain/pressure, sob. Reports 2 days of constipation. Denies s/sx of spinal cord compression. Has hx of HTN. Denies smoking hx. Confirms code status. Denies regular alcohol use, illicit drug use. Ten point ROS reviewed negative, unless as noted above    Objective: Intake/Output Summary (Last 24 hours) at 1/19/2020 2131  Last data filed at 1/19/2020 2101  Gross per 24 hour   Intake 1000 ml   Output --   Net 1000 ml        Vitals:   Vitals:    01/19/20 2032   BP: (!) 156/85   Pulse: 76   Resp: 18   Temp: 98.0   SpO2: 97%       Physical Exam:     GEN Awake female, sitting upright in bed in no apparent distress. Appears given age. EYES Pupils are equally round. No scleral erythema, discharge, or conjunctivitis. HENT Mucous membranes are moist. Oral pharynx without exudates, no evidence of thrush. NECK Supple, no apparent thyromegaly or masses. RESP Clear to auscultation, no wheezes, rales or rhonchi. Symmetric chest movement while on room air. CARDIO/VASC S1/S2 auscultated. Regular rate without appreciable murmurs, rubs, or gallops. No JVD or carotid bruits. Peripheral pulses equal bilaterally and palpable. No peripheral edema. GI Abdomen is soft without significant tenderness, masses, or guarding. Bowel sounds are normoactive. Rectal exam deferred.  No costovertebral angle tenderness. Krishnan catheter is not present. HEME/LYMPH No palpable cervical lymphadenopathy and no hepatosplenomegaly. No petechiae or ecchymoses. MSK No gross joint deformities. Strength equal in BLE and BUE  SKIN Normal coloration, warm, dry. NEURO Cranial nerves appear grossly intact, normal speech, no lateralizing weakness. PSYCH Awake, alert, oriented x 4. Affect appropriate.     Past Medical History:        Past Medical History:   Diagnosis Date    Closed compression fracture of thoracic activity:     Days per week: None     Minutes per session: None    Stress: None   Relationships    Social connections:     Talks on phone: None     Gets together: None     Attends Voodoo service: None     Active member of club or organization: None     Attends meetings of clubs or organizations: None     Relationship status: None    Intimate partner violence:     Fear of current or ex partner: None     Emotionally abused: None     Physically abused: None     Forced sexual activity: None   Other Topics Concern    None   Social History Narrative    None       Medications:     Medications:      famotidine (PEPCID) 20 MG tablet Take 1 tablet by mouth 2 times daily Romie Tanner DO Needs Review   tiZANidine (ZANAFLEX) 2 MG tablet Take 1 tablet by mouth 2 times daily JEAN Be - CNP Needs Review    Patient not taking: Reported on 1/17/2020     Multiple Vitamins-Minerals (MULTIVITAMIN ADULT EXTRA C PO) Take 1 tablet by mouth daily Historical Provider, MD Needs Review   traMADol (ULTRAM) 50 MG tablet Take 50 mg by mouth every 6 hours as needed for Pain. 200 mg in the morning, 100 at 5 pm, and 50 or 100 at bedtime Historical MD Enrique Needs Review   indapamide (LOZOL) 1.25 MG tablet TAKE 1 TABLET EVERY MORNING Manisha Solorzano MD Needs Review   gabapentin (NEURONTIN) 300 MG capsule TAKE 3 CAPSULES DAILY AT 5:00 P. M. AND TAKE 3 CAPSULES AT BEDTIME Manisha Solorzano MD Needs Review    Patient taking differently: 300 mg.  Take 1 in the morning, 1 at [facility], and 1 at Bedtime     diclofenac sodium 1 % GEL  Historical MD Enrique Needs Review   lisinopril (PRINIVIL;ZESTRIL) 10 MG tablet TAKE 1 TABLET DAILY IN THE EVENING FOR BLOOD PRESSURE Manisha Solorzano MD Needs Review   rOPINIRole (REQUIP) 1 MG tablet TAKE ONE AND ONE-HALF TABLETS AT NIGHT Manisha Solorzano MD Needs Review   metoprolol succinate (TOPROL XL) 50 MG extended release tablet TAKE 1 TABLET DAILY Manisha Solorzano MD Needs Review Cholecalciferol (VITAMIN D3) 1000 UNITS CAPS Take  by mouth daily. Historical Provider, MD Needs Review   omeprazole (PRILOSEC) 20 MG capsule Take 1 capsule by mouth 2 times daily. Ranjit Garza MD Needs Review    Patient not taking: Reported on 1/17/2020         Data:     CTA PULMONARY W CONTRAST [[id number]] Collected: 01/19/20 2019      Order Status: Completed Updated: 01/19/20 2044     Narrative:       EXAMINATION:  CTA OF THE CHEST 1/19/2020 8:12 pm    TECHNIQUE:  CTA of the chest was performed after the administration of intravenous  contrast.  Multiplanar reformatted images are provided for review.  MIP  images are provided for review. Dose modulation, iterative reconstruction,  and/or weight based adjustment of the mA/kV was utilized to reduce the  radiation dose to as low as reasonably achievable. COMPARISON:  January 8    HISTORY:  ORDERING SYSTEM PROVIDED HISTORY: LEFT chest pain, recent diagnoses of CA  TECHNOLOGIST PROVIDED HISTORY:  Reason for exam:->LEFT chest pain, recent diagnoses of CA  Reason for Exam: masses    FINDINGS:  Pulmonary Arteries: There is slight limitation of a few of the small distal  pulmonary arterial branches.  No definitive filling defects are noted.     Mediastinum: Again noted is the large soft tissue mass on both sides of the  spine largest at the level of the álvaro.  This mass lesion measures  approximately 9.4 x 8.3 cm on axial imaging.  Craniocaudal extent, including  the pleural thickening is at least 16 cm.  Additional pleural thickening on  the right extends over the apex.  There is mass effect on the posterior  aspect of the lower trachea and álvaro.  There is flattening of the posterior  aspect of the main bronchi, right greater than left.  The mass partially  surrounds the aorta.  Additional focal pleural thickening in the anterior  right hemithorax is noted on image 71.  Additional focal pleural thickening  is noted in the anterior right hemithorax on image 76.    Heterogeneous right paratracheal lesion is likely due to the heterogeneously  enlarged thyroid tissue extending into the mediastinum.  This stable. Vascular calcifications are noted. Twan Linear is no aneurysm or dissection. Lungs/pleura: Minimal atelectasis in the inferior lingula is noted.  Small  nodular density along the anterior right major fissure is noted on image 60. Similar findings were noted on the prior.  Scattered atelectasis on the right  is noted. Upper Abdomen: Limited evaluation of the abdomen demonstrates a small cystic  focus in the anterior aspect of the liver with associated calcification. Adjacent tiny cystic focus is noted more medially in the left lobe, also with  an associated small calcification.  Multiple lymph nodes near the sofi  hepatis and portal vein are again noted.  The largest in this area measures  approximately 1.6 cm long axis. Soft Tissues/Bones: No acute osseous abnormality is noted.  Again noted is  the sclerosis and compression fracture of a midthoracic vertebral body. Small focus of sclerotic density is noted in the vertebral body 2 levels  above this.     Impression:       No findings diagnostic of pulmonary embolus    Large bilateral paraspinal mass in the chest, greatest right of midline. There is associated pleural thickening extending from the apex to the lung  base. Additional smaller separate areas of pleural thickening on the right are  noted as well.  This is likely due to a large neoplasm.  Biopsy and PET scan  recommended. Stable compression fracture in the midthoracic spine with patchy sclerosis.   This is age indeterminate and certainly could be related to the tumor.  MRI  of the thoracic spine would be more helpful in this regard.  MRI would also  help to better evaluate for extension into the canal.     CT ABDOMEN PELVIS W IV CONTRAST [[id number]] Collected: 01/19/20 2020     Order Status: Completed Updated: 01/19/20 2039     Narrative:     EXAMINATION:  CT OF THE ABDOMEN AND PELVIS WITH CONTRAST, 1/19/2020 8:11 pm    TECHNIQUE:  CT of the abdomen and pelvis was performed with the administration of  intravenous contrast. Multiplanar reformatted images are provided for review. Dose modulation, iterative reconstruction, and/or weight based adjustment of  the mA/kV was utilized to reduce the radiation dose to as low as reasonably  achievable. COMPARISON:  CT of the chest 01/08/2020 and 01/19/2020. HISTORY:  ORDERING SYSTEM PROVIDED HISTORY: Abdominal pain. TECHNOLOGIST PROVIDED HISTORY:  Reason for exam:->Abdominal pain  Reason for Exam: Recent masses found on scan. FINDINGS:  Lower Chest: There is redemonstration of a paraspinal soft tissue mass in the  thoracic region, more completely evaluated on CT of the chest of the same  date.  Pleural-based soft tissue mass in the anterior right hemithorax is  also noted, stable.  No acute infiltrate at the lung bases. Organs: 3.0 cm left hepatic lesion, stable and likely a cyst.  No other focal  hepatic abnormality.  Previous cholecystectomy with no significant biliary  dilatation.  The spleen, pancreas and adrenal glands are unremarkable. Bilateral pyelocaliectasis with no ureterectasis or obstructing calculi. Several right renal cysts are noted, the largest measuring 2.1 cm  posteriorly.  No solid renal mass. GI/Bowel: Colonic diverticulosis with no CT evidence of diverticulitis.  The  appendix is unremarkable.  No small bowel distension.  The stomach and  duodenal C-loop are intact.  Duodenal diverticula are noted. Pelvis: No pelvic mass or free pelvic fluid.  No significant pelvic or  inguinal adenopathy.  Calcification within the uterine fundus, likely a  degenerated fibroid.  Partial distention of the urinary bladder. Peritoneum/Retroperitoneum: The abdominal aorta is normal in caliber.   Calcified atherosclerotic plaque.  No retroperitoneal adenopathy or upper  abdominal

## 2020-01-20 NOTE — PROGRESS NOTES
Chart briefly reviewed and d/w nurse  Na increased 10 meq/L  In < 12 h  So stop NS  Start  D5 water at 100 ml/h  Also d/c HCTZ/ NSAID for now  Full consult to come
Hospitalist Progress Note      Name:  Jesus Sterling /Age/Sex: [birth year]  (75 y.o. female)   MRN & CSN:  [id number] & [id number] Admission Date/Time: 2020  6:24 PM   Location:  1234/2446-P PCP: Sami Brendia Brunner, [facility] [place] Day: 2    Assessment and Plan:   Jesus Sterling is a 78 y.o.  female  who presents with:     Hyponatremia, 123 on admission. ddx includes malignancy related, drug induced. Nephrology consulted. Held diuretics.  Upper abdominal pain, consistent with her malignancy pain. Denies constipation to me   Lung mass, paraspinal mass and associated thoracic pain related to malignancy. Needle bx scheduled OP Wednesday and PET scan Thursday   Acute malignancy related pain    Working toward pain control - lidoderm patch to spine and morphine right now. May need pain meds at dc as well. OP appt with Dr Wanda Good for oncology, Jn for pulm  Talked about her anxiety - will start zoloft and melatonin tonight as well    Diet DIET GENERAL;  Dietary Nutrition Supplements: Low Calorie High Protein Supplement   DVT Prophylaxis [] Lovenox, []  Heparin, [] SCDs, []No VTE prophylaxis, patient ambulating   GI Prophylaxis [] PPI, [] H2 Blocker, [] No GI prophylaxis, patient is receiving diet/Tube Feeds   Code Status Full Code   Disposition Patient requires continued admission due to high dose pain medicaitons    Dc to home in AM if more controlled and Na+ WNL   MDM [] Low, [] Moderate,[x]  High  Patient's risk as above due to     [] One or more chronic illnesses with severe exacerbation or progression    [] Acute or chronic illnesses or injuries that pose a threat to life or bodily function    [] An abrupt change in neurological status    [] Decision not to resuscitate     [x] Drug therapy requiring intensive monitoring for toxicity      History of Present Illness:     Pt S&E. Cousin at bedside. Pt states pain is still present in her upper abdomen.   Appears anxious and admits to anxious as the
Skin check with Vania pct.  No skin issues
signed by Wendie Hatfield RD, AROLDO on 1/20/20 at 11:51 AM    Contact Number: [phone]

## 2020-01-20 NOTE — PLAN OF CARE
Nutrition Problem: Inadequate oral intake  Intervention: Food and/or Nutrient Delivery: Continue current diet, Start ONS  Nutritional Goals:  Patient will meet at least 75% of estimated nutrient needs during los with meals and supplements provided

## 2020-01-21 VITALS
HEIGHT: 63 IN | SYSTOLIC BLOOD PRESSURE: 139 MMHG | BODY MASS INDEX: 29.43 KG/M2 | TEMPERATURE: 98.7 F | HEART RATE: 86 BPM | DIASTOLIC BLOOD PRESSURE: 51 MMHG | RESPIRATION RATE: 13 BRPM | WEIGHT: 166.1 LBS | OXYGEN SATURATION: 97 %

## 2020-01-21 LAB
ANION GAP SERPL CALCULATED.3IONS-SCNC: 13 MMOL/L (ref 4–16)
BUN BLDV-MCNC: 8 MG/DL (ref 6–23)
CALCIUM SERPL-MCNC: 9.1 MG/DL (ref 8.3–10.6)
CHLORIDE BLD-SCNC: 94 MMOL/L (ref 99–110)
CO2: 25 MMOL/L (ref 21–32)
CREAT SERPL-MCNC: 0.5 MG/DL (ref 0.6–1.1)
GFR AFRICAN AMERICAN: >60 ML/MIN/1.73M2
GFR NON-AFRICAN AMERICAN: >60 ML/MIN/1.73M2
GLUCOSE BLD-MCNC: 112 MG/DL (ref 70–99)
POTASSIUM SERPL-SCNC: 4.2 MMOL/L (ref 3.5–5.1)
SODIUM BLD-SCNC: 132 MMOL/L (ref 135–145)

## 2020-01-21 PROCEDURE — 80048 BASIC METABOLIC PNL TOTAL CA: CPT

## 2020-01-21 PROCEDURE — 6370000000 HC RX 637 (ALT 250 FOR IP): Performed by: STUDENT IN AN ORGANIZED HEALTH CARE EDUCATION/TRAINING PROGRAM

## 2020-01-21 PROCEDURE — 36415 COLL VENOUS BLD VENIPUNCTURE: CPT

## 2020-01-21 PROCEDURE — 2580000003 HC RX 258: Performed by: NURSE PRACTITIONER

## 2020-01-21 PROCEDURE — 6360000002 HC RX W HCPCS: Performed by: NURSE PRACTITIONER

## 2020-01-21 PROCEDURE — 6370000000 HC RX 637 (ALT 250 FOR IP): Performed by: NURSE PRACTITIONER

## 2020-01-21 RX ORDER — SODIUM CHLORIDE 0.9 % (FLUSH) 0.9 %
10 SYRINGE (ML) INJECTION 2 TIMES DAILY
Status: CANCELLED | OUTPATIENT
Start: 2020-01-21

## 2020-01-21 RX ORDER — UREA 10 %
10 LOTION (ML) TOPICAL NIGHTLY
Qty: 10 TABLET | Refills: 0 | Status: SHIPPED | OUTPATIENT
Start: 2020-01-21

## 2020-01-21 RX ORDER — SERTRALINE HYDROCHLORIDE 25 MG/1
25 TABLET, FILM COATED ORAL NIGHTLY
Qty: 30 TABLET | Refills: 3 | Status: SHIPPED | OUTPATIENT
Start: 2020-01-21

## 2020-01-21 RX ADMIN — TRAMADOL HYDROCHLORIDE 50 MG: 50 TABLET, FILM COATED ORAL at 11:12

## 2020-01-21 RX ADMIN — METOPROLOL SUCCINATE 50 MG: 50 TABLET, EXTENDED RELEASE ORAL at 08:03

## 2020-01-21 RX ADMIN — SODIUM CHLORIDE, PRESERVATIVE FREE 10 ML: 5 INJECTION INTRAVENOUS at 08:04

## 2020-01-21 RX ADMIN — POLYETHYLENE GLYCOL (3350) 17 G: 17 POWDER, FOR SOLUTION ORAL at 08:03

## 2020-01-21 RX ADMIN — ENOXAPARIN SODIUM 40 MG: 40 INJECTION SUBCUTANEOUS at 08:04

## 2020-01-21 RX ADMIN — GABAPENTIN 300 MG: 300 CAPSULE ORAL at 08:03

## 2020-01-21 RX ADMIN — FAMOTIDINE 20 MG: 20 TABLET ORAL at 08:03

## 2020-01-21 RX ADMIN — DOCUSATE SODIUM 100 MG: 100 CAPSULE, LIQUID FILLED ORAL at 08:03

## 2020-01-21 ASSESSMENT — PAIN SCALES - GENERAL
PAINLEVEL_OUTOF10: 3
PAINLEVEL_OUTOF10: 8
PAINLEVEL_OUTOF10: 0

## 2020-01-21 ASSESSMENT — PAIN DESCRIPTION - PAIN TYPE: TYPE: CHRONIC PAIN

## 2020-01-21 ASSESSMENT — PAIN DESCRIPTION - LOCATION: LOCATION: GENERALIZED

## 2020-01-21 NOTE — CONSULTS
Pt seen ,examined,interviewed and chart reviewed. Please see the dictated consult for details     Imp :   1. Hyponatremia- unknoebn durauon last availabale na was on 1/3/19- 139 meq/l- l absence of overt sx suggest ch issue - she was on indapamide ( thazide ) and took NSAID both can cause along with ? 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 Liver mrtes- ectpic ADH is apossibility - ? Mild UP junction  Obstruction   - likely congenital- no need to intervene  with her nl renal fx-   2. Paraspinal mass/ no tissue dx yet   3. Melanoma/HTN/GERD etc     Plan:  1. Stop NSAID/HCTZ  2. Also her na went high  Rapidly  so d/c NS and dextrose  initiated   3. plan to restrict  na rise up to  6-8 meq/24 h  4. aiwat  tissue dz   5.  Follow clinically'  6. may add arginin  Level in am (although most of th time is useless )        Thanks for the consult    #[id number]
lisinopril, Requip,  metoprolol, vitamin D, and omeprazole. CURRENT MEDICATIONS:  Here in the hospital as I mentioned, I stopped the  normal saline. She is otherwise on Lovenox, gabapentin, Pepcid,  lisinopril, metoprolol, Requip, and MiraLax for constipation. REVIEW OF SYSTEMS:  Of course she has back pain that started in 12/2019  which led to the paraspinal mass finding along with some nausea and  vomiting, not currently now. She had some abdominal and back pain which  is better now. Rest of the review of systems is negative other than  previous paragraph. PHYSICAL EXAMINATION:  VITAL SIGNS:  At the time of examination reveals temperature her T-max  is 98.2, blood pressure was 150s/60s this is an automatic blood  pressure, pulse 94, respiratory rate is 12, satting 97%. GENERAL:  The patient is not in any acute distress. HEAD AND NECK:  Normocephalic, atraumatic. EYES:  No conjunctival pallor. EAR, MOUTH, AND THROAT:  Normal oral mucosa and oropharynx. CARDIOVASCULAR:  Seems regular rate and rhythm. RESPIRATORY:  I did not hear any crackles. ABDOMEN:  Soft. EXTREMITIES:  No edema. She does have some back pain and some  epigastric area pain. LABORATORY VALUES AND ANCILLARY SERVICES:  As mentioned earlier. IMPRESSION:  A 66-year-old female with hyponatremia. 1.  Hyponatremia of unknown duration. Her last available sodium was in  01/03/2020 that was 139. Absence of overt symptoms suggest chronic  issue. She was on indapamide as well as took some NSAID in a gel form  which both can cause it. Whether she had some intravascular volume  depletion is quite possible. 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 2.  Imaging study suggested UP junction obstruction, but her kidney  function is normal.  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 I am not  too worried about it. 3.  Paraspinal mass. No tissue diagnosis yet.   4.  History of

## 2020-01-22 ENCOUNTER — HOSPITAL ENCOUNTER (OUTPATIENT)
Dept: CT IMAGING | Age: 80
Discharge: HOME | End: 2020-01-22
Payer: MEDICARE

## 2020-01-22 ENCOUNTER — HOSPITAL ENCOUNTER (OUTPATIENT)
Dept: GENERAL RADIOLOGY | Age: 80
Discharge: HOME | End: 2020-01-22
Payer: MEDICARE

## 2020-01-22 VITALS
SYSTOLIC BLOOD PRESSURE: 147 MMHG | OXYGEN SATURATION: 96 % | DIASTOLIC BLOOD PRESSURE: 65 MMHG | RESPIRATION RATE: 83 BRPM | HEART RATE: 83 BPM

## 2020-01-22 VITALS
DIASTOLIC BLOOD PRESSURE: 58 MMHG | TEMPERATURE: 97 F | OXYGEN SATURATION: 98 % | RESPIRATION RATE: 16 BRPM | SYSTOLIC BLOOD PRESSURE: 120 MMHG | HEART RATE: 78 BPM

## 2020-01-22 LAB
APTT: 33.2 SECONDS (ref 25.1–37.1)
INR BLD: 1.12 INDEX
PROTHROMBIN TIME: 13.6 SECONDS (ref 11.7–14.5)

## 2020-01-22 PROCEDURE — 88341 IMHCHEM/IMCYTCHM EA ADD ANTB: CPT

## 2020-01-22 PROCEDURE — 6370000000 HC RX 637 (ALT 250 FOR IP)

## 2020-01-22 PROCEDURE — 88377 M/PHMTRC ALYS ISHQUANT/SEMIQ: CPT

## 2020-01-22 PROCEDURE — 71045 X-RAY EXAM CHEST 1 VIEW: CPT

## 2020-01-22 PROCEDURE — 32405 CT NEEDLE BIOPSY LUNG PERCUTANEOUS: CPT

## 2020-01-22 PROCEDURE — 88342 IMHCHEM/IMCYTCHM 1ST ANTB: CPT

## 2020-01-22 PROCEDURE — 88184 FLOWCYTOMETRY/ TC 1 MARKER: CPT

## 2020-01-22 PROCEDURE — 2580000003 HC RX 258: Performed by: RADIOLOGY

## 2020-01-22 PROCEDURE — 88307 TISSUE EXAM BY PATHOLOGIST: CPT

## 2020-01-22 PROCEDURE — 7100000010 HC PHASE II RECOVERY - FIRST 15 MIN

## 2020-01-22 PROCEDURE — 6360000002 HC RX W HCPCS: Performed by: RADIOLOGY

## 2020-01-22 PROCEDURE — 88334 PATH CONSLTJ SURG CYTO XM EA: CPT

## 2020-01-22 PROCEDURE — 88333 PATH CONSLTJ SURG CYTO XM 1: CPT

## 2020-01-22 PROCEDURE — 85610 PROTHROMBIN TIME: CPT

## 2020-01-22 PROCEDURE — 88185 FLOWCYTOMETRY/TC ADD-ON: CPT

## 2020-01-22 PROCEDURE — 2709999900 HC NON-CHARGEABLE SUPPLY

## 2020-01-22 PROCEDURE — 85730 THROMBOPLASTIN TIME PARTIAL: CPT

## 2020-01-22 PROCEDURE — 7100000011 HC PHASE II RECOVERY - ADDTL 15 MIN

## 2020-01-22 RX ORDER — FENTANYL CITRATE 50 UG/ML
50 INJECTION, SOLUTION INTRAMUSCULAR; INTRAVENOUS ONCE
Status: COMPLETED | OUTPATIENT
Start: 2020-01-22 | End: 2020-01-22

## 2020-01-22 RX ORDER — MIDAZOLAM HYDROCHLORIDE 1 MG/ML
1 INJECTION INTRAMUSCULAR; INTRAVENOUS ONCE
Status: COMPLETED | OUTPATIENT
Start: 2020-01-22 | End: 2020-01-22

## 2020-01-22 RX ORDER — ACETAMINOPHEN 325 MG/1
650 TABLET ORAL EVERY 4 HOURS PRN
Status: DISCONTINUED | OUTPATIENT
Start: 2020-01-22 | End: 2020-01-23 | Stop reason: HOSPADM

## 2020-01-22 RX ORDER — ACETAMINOPHEN 325 MG/1
650 TABLET ORAL EVERY 4 HOURS PRN
Status: DISCONTINUED | OUTPATIENT
Start: 2020-01-22 | End: 2020-01-22 | Stop reason: SDUPTHER

## 2020-01-22 RX ORDER — SODIUM CHLORIDE 0.9 % (FLUSH) 0.9 %
10 SYRINGE (ML) INJECTION 2 TIMES DAILY
Status: DISCONTINUED | OUTPATIENT
Start: 2020-01-22 | End: 2020-01-23 | Stop reason: HOSPADM

## 2020-01-22 RX ORDER — ACETAMINOPHEN 325 MG/1
TABLET ORAL
Status: COMPLETED
Start: 2020-01-22 | End: 2020-01-22

## 2020-01-22 RX ORDER — SODIUM CHLORIDE 9 MG/ML
INJECTION, SOLUTION INTRAVENOUS CONTINUOUS
Status: DISCONTINUED | OUTPATIENT
Start: 2020-01-22 | End: 2020-01-23 | Stop reason: HOSPADM

## 2020-01-22 RX ADMIN — ACETAMINOPHEN 650 MG: 325 TABLET ORAL at 12:52

## 2020-01-22 RX ADMIN — FENTANYL CITRATE 50 MCG: 50 INJECTION INTRAMUSCULAR; INTRAVENOUS at 11:51

## 2020-01-22 RX ADMIN — MIDAZOLAM 1 MG: 1 INJECTION INTRAMUSCULAR; INTRAVENOUS at 11:52

## 2020-01-22 RX ADMIN — SODIUM CHLORIDE 500 ML: 9 INJECTION, SOLUTION INTRAVENOUS at 11:22

## 2020-01-22 ASSESSMENT — PAIN SCALES - GENERAL
PAINLEVEL_OUTOF10: 6
PAINLEVEL_OUTOF10: 5
PAINLEVEL_OUTOF10: 3

## 2020-01-22 NOTE — DISCHARGE SUMMARY
obstructions.  No  evidence of obstructive uropathy. 3. See CT of the chest of the same date for findings related to the  paraspinal mass in the lower chest.    Discharge Exam:  GEN    Awake female, laying in bed in no apparent distress. Appears given age. EYES   Pupils are equally round. No scleral discharge  HENT  Atraumatic and symmetric head  NECK  No apparent thyromegaly  RESP  Symmetric chest movement while on room air. CARDIO/VASC           Peripheral pulses equal bilaterally and palpable. No peripheral edema. GI        Abdomen is not distended. Rectal exam deferred.        Krishnan catheter is not present. HEME/LYMPH            No petechiae or ecchymoses. MSK    Spontaneous movement of BL upper extremities  SKIN    Normal coloration, warm, dry. NEURO           Cranial nerves appear grossly intact  PSYCH            Awake, alert. Oriented x4. Disposition: home    Patient Instructions:     Discharge Medications:   [facility] Med[facility] Instructions XEN:[id number]    Printed on:01/21/20 2295   Medication Information                      famotidine (PEPCID) 20 MG tablet  Take 1 tablet by mouth 2 times daily             gabapentin (NEURONTIN) 300 MG capsule  TAKE 3 CAPSULES DAILY AT 5:00 P. M. AND TAKE 3 CAPSULES AT BEDTIME             lisinopril (PRINIVIL;ZESTRIL) 10 MG tablet  TAKE 1 TABLET DAILY IN THE EVENING FOR BLOOD PRESSURE             melatonin 1 MG tablet  Take 10 tablets by mouth nightly             metoprolol succinate (TOPROL XL) 50 MG extended release tablet  TAKE 1 TABLET DAILY             rOPINIRole (REQUIP) 1 MG tablet  TAKE ONE AND ONE-HALF TABLETS AT NIGHT             sertraline (ZOLOFT) 25 MG tablet  Take 1 tablet by mouth nightly             traMADol (ULTRAM) 50 MG tablet  Take 50 mg by mouth every 6 hours as needed for Pain.  200 mg in the morning, 100 at 5 pm, and 50 or 100 at bedtime                 Activity: activity as tolerated    Diet: cardiac diet    Wound Care: none needed    Follow-up:   With IR for biopsy  Oncology  With Nephrology 4 in weeks    Time Spent Doing discharge 26 minutes  Electronically signed by Meryl Doyle MD  on 1/21/20 at 9:09 PM

## 2020-01-22 NOTE — H&P
gabapentin (NEURONTIN) 300 MG capsule TAKE 3 CAPSULES DAILY AT 5:00 P. M. AND TAKE 3 CAPSULES AT BEDTIME (Patient taking differently: 300 mg. Take 1 in the morning, 1 at [address], and 1 at Bedtime) 540 capsule 1    lisinopril (PRINIVIL;ZESTRIL) 10 MG tablet TAKE 1 TABLET DAILY IN THE EVENING FOR BLOOD PRESSURE 90 tablet 3    rOPINIRole (REQUIP) 1 MG tablet TAKE ONE AND ONE-HALF TABLETS AT NIGHT 150 tablet 3    metoprolol succinate (TOPROL XL) 50 MG extended release tablet TAKE 1 TABLET DAILY 90 tablet 3     No current facility-administered medications on file prior to encounter. Vital Signs:  @FLOWDT(6:last)@ @FLOWSTATM(6:24)@ @FLOWDT(5:last)@ @FLOWDT(8:last)@ @FLOWDT(9:last)@ @FLOWDT(10:last)@   @FLOWDT(14:first)@  @FLOWDT(14:last)@  There is no height or weight on file to calculate BMI. Laboratory:  Recent Labs     01/19/20  1735 01/20/20  0424 01/20/20  0737 01/20/20  1748 01/21/20  0338 01/22/20  0900   WBC 8.5 6.5  --   --   --   --    * 133* 129* 131* 132*  --    CL 84* 94* 91* 91* 94*  --    CO2 25 26 26 24 25  --    BUN 9 9 8 10 8  --    CREATININE 0.5* 0.5* 0.5* 0.8 0.5*  --    GLUCOSE 115* 115* 118* 144* 112*  --    INR  --   --   --   --   --  1.12     INR @LABR24(INR)@    Physical Exam:  GENERAL:Well developed, well nourished in NAD  RESPIRATORY:Clear to auscultation  HEART:RRR,no murmer, gallop or friction rub      Impression:  Active Problems:    * No active hospital problems. *  Resolved Problems:    * No resolved hospital problems.  *    CT guided biopsy of a right paraspinous mass    Mallampati Score 2  ASA class 3    PLAN OF CARE/PLANNED PROCEDURE    LUNG BIOPSY [[id number]]

## 2020-01-23 ENCOUNTER — TELEPHONE (OUTPATIENT)
Dept: INTERNAL MEDICINE CLINIC | Age: 80
End: 2020-01-23

## 2020-01-23 RX ORDER — OXYCODONE HYDROCHLORIDE AND ACETAMINOPHEN 5; 325 MG/1; MG/1
1 TABLET ORAL EVERY 6 HOURS PRN
Qty: 12 TABLET | Refills: 0 | Status: SHIPPED | OUTPATIENT
Start: 2020-01-23 | End: 2020-01-26

## 2020-01-25 ENCOUNTER — APPOINTMENT (OUTPATIENT)
Dept: CT IMAGING | Age: 80
End: 2020-01-25
Payer: MEDICARE

## 2020-01-25 ENCOUNTER — HOSPITAL ENCOUNTER (EMERGENCY)
Age: 80
Discharge: ANOTHER HOSPITAL | End: 2020-01-25
Attending: EMERGENCY MEDICINE
Payer: MEDICARE

## 2020-01-25 VITALS
HEART RATE: 86 BPM | WEIGHT: 166.1 LBS | RESPIRATION RATE: 12 BRPM | TEMPERATURE: 98.1 F | OXYGEN SATURATION: 95 % | HEIGHT: 63 IN | DIASTOLIC BLOOD PRESSURE: 85 MMHG | SYSTOLIC BLOOD PRESSURE: 191 MMHG | BODY MASS INDEX: 29.43 KG/M2

## 2020-01-25 LAB
ALBUMIN SERPL-MCNC: 3.5 GM/DL (ref 3.4–5)
ALP BLD-CCNC: 102 IU/L (ref 40–129)
ALT SERPL-CCNC: 18 U/L (ref 10–40)
ANION GAP SERPL CALCULATED.3IONS-SCNC: 14 MMOL/L (ref 4–16)
AST SERPL-CCNC: 27 IU/L (ref 15–37)
BASOPHILS ABSOLUTE: 0 K/CU MM
BASOPHILS RELATIVE PERCENT: 0.2 % (ref 0–1)
BILIRUB SERPL-MCNC: 0.4 MG/DL (ref 0–1)
BUN BLDV-MCNC: 5 MG/DL (ref 6–23)
CALCIUM SERPL-MCNC: 9 MG/DL (ref 8.3–10.6)
CHLORIDE BLD-SCNC: 92 MMOL/L (ref 99–110)
CO2: 23 MMOL/L (ref 21–32)
CREAT SERPL-MCNC: 0.4 MG/DL (ref 0.6–1.1)
DIFFERENTIAL TYPE: ABNORMAL
EOSINOPHILS ABSOLUTE: 0 K/CU MM
EOSINOPHILS RELATIVE PERCENT: 0.4 % (ref 0–3)
GFR AFRICAN AMERICAN: >60 ML/MIN/1.73M2
GFR NON-AFRICAN AMERICAN: >60 ML/MIN/1.73M2
GLUCOSE BLD-MCNC: 103 MG/DL (ref 70–99)
HCT VFR BLD CALC: 39.8 % (ref 37–47)
HEMOGLOBIN: 12.6 GM/DL (ref 12.5–16)
IMMATURE NEUTROPHIL %: 0.7 % (ref 0–0.43)
LYMPHOCYTES ABSOLUTE: 1.2 K/CU MM
LYMPHOCYTES RELATIVE PERCENT: 13.2 % (ref 24–44)
MCH RBC QN AUTO: 26.6 PG (ref 27–31)
MCHC RBC AUTO-ENTMCNC: 31.7 % (ref 32–36)
MCV RBC AUTO: 84 FL (ref 78–100)
MONOCYTES ABSOLUTE: 1 K/CU MM
MONOCYTES RELATIVE PERCENT: 10.9 % (ref 0–4)
NUCLEATED RBC %: 0 %
PDW BLD-RTO: 13.2 % (ref 11.7–14.9)
PLATELET # BLD: 272 K/CU MM (ref 140–440)
PMV BLD AUTO: 11.1 FL (ref 7.5–11.1)
POTASSIUM SERPL-SCNC: 4.4 MMOL/L (ref 3.5–5.1)
RBC # BLD: 4.74 M/CU MM (ref 4.2–5.4)
SEGMENTED NEUTROPHILS ABSOLUTE COUNT: 6.8 K/CU MM
SEGMENTED NEUTROPHILS RELATIVE PERCENT: 74.6 % (ref 36–66)
SODIUM BLD-SCNC: 129 MMOL/L (ref 135–145)
TOTAL CK: 81 IU/L (ref 26–140)
TOTAL IMMATURE NEUTOROPHIL: 0.06 K/CU MM
TOTAL NUCLEATED RBC: 0 K/CU MM
TOTAL PROTEIN: 7.1 GM/DL (ref 6.4–8.2)
WBC # BLD: 9.2 K/CU MM (ref 4–10.5)

## 2020-01-25 PROCEDURE — 99285 EMERGENCY DEPT VISIT HI MDM: CPT

## 2020-01-25 PROCEDURE — 6360000002 HC RX W HCPCS: Performed by: EMERGENCY MEDICINE

## 2020-01-25 PROCEDURE — 72128 CT CHEST SPINE W/O DYE: CPT

## 2020-01-25 PROCEDURE — 80053 COMPREHEN METABOLIC PANEL: CPT

## 2020-01-25 PROCEDURE — 96374 THER/PROPH/DIAG INJ IV PUSH: CPT

## 2020-01-25 PROCEDURE — 72131 CT LUMBAR SPINE W/O DYE: CPT

## 2020-01-25 PROCEDURE — 96376 TX/PRO/DX INJ SAME DRUG ADON: CPT

## 2020-01-25 PROCEDURE — 85025 COMPLETE CBC W/AUTO DIFF WBC: CPT

## 2020-01-25 PROCEDURE — 82550 ASSAY OF CK (CPK): CPT

## 2020-01-25 RX ORDER — HYDROMORPHONE HCL 110MG/55ML
0.5 PATIENT CONTROLLED ANALGESIA SYRINGE INTRAVENOUS EVERY 10 MIN PRN
Status: DISCONTINUED | OUTPATIENT
Start: 2020-01-25 | End: 2020-01-25 | Stop reason: HOSPADM

## 2020-01-25 RX ADMIN — HYDROMORPHONE HYDROCHLORIDE 0.5 MG: 2 INJECTION, SOLUTION INTRAMUSCULAR; INTRAVENOUS; SUBCUTANEOUS at 17:15

## 2020-01-25 RX ADMIN — HYDROMORPHONE HYDROCHLORIDE 0.5 MG: 2 INJECTION, SOLUTION INTRAMUSCULAR; INTRAVENOUS; SUBCUTANEOUS at 14:34

## 2020-01-25 RX ADMIN — HYDROMORPHONE HYDROCHLORIDE 0.5 MG: 2 INJECTION, SOLUTION INTRAMUSCULAR; INTRAVENOUS; SUBCUTANEOUS at 13:33

## 2020-01-25 RX ADMIN — HYDROMORPHONE HYDROCHLORIDE 0.5 MG: 2 INJECTION, SOLUTION INTRAMUSCULAR; INTRAVENOUS; SUBCUTANEOUS at 18:14

## 2020-01-25 RX ADMIN — HYDROMORPHONE HYDROCHLORIDE 0.5 MG: 2 INJECTION, SOLUTION INTRAMUSCULAR; INTRAVENOUS; SUBCUTANEOUS at 11:30

## 2020-01-25 ASSESSMENT — PAIN SCALES - GENERAL
PAINLEVEL_OUTOF10: 8
PAINLEVEL_OUTOF10: 4
PAINLEVEL_OUTOF10: 9
PAINLEVEL_OUTOF10: 9
PAINLEVEL_OUTOF10: 3

## 2020-01-25 ASSESSMENT — PAIN DESCRIPTION - LOCATION: LOCATION: BACK

## 2020-01-25 ASSESSMENT — PAIN DESCRIPTION - ORIENTATION: ORIENTATION: LOWER;MID

## 2020-01-25 ASSESSMENT — PAIN DESCRIPTION - PAIN TYPE: TYPE: ACUTE PAIN

## 2020-01-25 NOTE — ED PROVIDER NOTES
Value Ref Range    WBC 9.2 4.0 - 10.5 K/CU MM    RBC 4.74 4.2 - 5.4 M/CU MM    Hemoglobin 12.6 12.5 - 16.0 GM/DL    Hematocrit 39.8 37 - 47 %    MCV 84.0 78 - 100 FL    MCH 26.6 (L) 27 - 31 PG    MCHC 31.7 (L) 32.0 - 36.0 %    RDW 13.2 11.7 - 14.9 %    Platelets 991 925 - 970 K/CU MM    MPV 11.1 7.5 - 11.1 FL    Differential Type AUTOMATED DIFFERENTIAL     Segs Relative 74.6 (H) 36 - 66 %    Lymphocytes % 13.2 (L) 24 - 44 %    Monocytes % 10.9 (H) 0 - 4 %    Eosinophils % 0.4 0 - 3 %    Basophils % 0.2 0 - 1 %    Segs Absolute 6.8 K/CU MM    Lymphocytes Absolute 1.2 K/CU MM    Monocytes Absolute 1.0 K/CU MM    Eosinophils Absolute 0.0 K/CU MM    Basophils Absolute 0.0 K/CU MM    Nucleated RBC % 0.0 %    Total Nucleated RBC 0.0 K/CU MM    Total Immature Neutrophil 0.06 K/CU MM    Immature Neutrophil % 0.7 (H) 0 - 0.43 %   CMP   Result Value Ref Range    Sodium 129 (L) 135 - 145 MMOL/L    Potassium 4.4 3.5 - 5.1 MMOL/L    Chloride 92 (L) 99 - 110 mMol/L    CO2 23 21 - 32 MMOL/L    BUN 5 (L) 6 - 23 MG/DL    CREATININE 0.4 (L) 0.6 - 1.1 MG/DL    Glucose 103 (H) 70 - 99 MG/DL    Calcium 9.0 8.3 - 10.6 MG/DL    Alb 3.5 3.4 - 5.0 GM/DL    Total Protein 7.1 6.4 - 8.2 GM/DL    Total Bilirubin 0.4 0.0 - 1.0 MG/DL    ALT 18 10 - 40 U/L    AST 27 15 - 37 IU/L    Alkaline Phosphatase 102 40 - 129 IU/L    GFR Non-African American >60 >60 mL/min/1.73m2    GFR African American >60 >60 mL/min/1.73m2    Anion Gap 14 4 - 16   CK   Result Value Ref Range    Total CK 81 26 - 140 IU/L         RADIOLOGY/PROCEDURES    CT LUMBAR SPINE WO CONTRAST   Preliminary Result   Unchanged subacute, probably pathologic, T6 compression fracture. 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 Critical results were called by Dr. Gen Montes MD to [address] [place] [address] on   1/25/2020 at 13:37. CT THORACIC SPINE WO CONTRAST   Preliminary Result   Unchanged subacute, probably pathologic, T6 compression fracture.       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 Critical results were called by Dr. Yary Lam MD to [address] [place] [address] on   1/25/2020 at 13:37. ED COURSE & MEDICAL DECISION MAKING                  Patient presents as above. She was here 6 days ago, and imaging revealed a lung mass which was biopsied and her preluminary results show B-cell lymphoma. Concern for a compression fracture in her thoracic spine that was seen on the CTA at that time. Today she is presented for severe pain in the back, weakness and numbness in the legs. Exam reveals definite weakness in the lower extremities, hyperreflexia. Stable vital signs except for hypertension. Pain is now controlled on Dilaudid. Labs reveal minor signs of dehydration CK within normal limits, otherwise unremarkable. CT of lumbar and thoracic spines show unchanged subacute, probably pathologic, T6 compression fracture. 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 Needs MRI for further evaluation. Discussed these findings as well as the need for transfer and admission with the patient, they understand and are amenable to it at this time, requesting transfer to [facility]. Consult placed [facility] neurosurgery. call from [facility], they will accept the patient through the emergency department. We are to arrange transport. Dr. Lanny Bernheim is the receiving physician. Requesting us to push current imaging plus imaging from 6 days ago. I have made radiology aware of this request.    Received call from Dr. Edmund Hayes, from [facility] neurosurgery. He had a discussion with Dr. Lori Morelos. Please see Dr. Nayana Rose note for information related to this conversation. Notified by unit clerk that transportation will be available at [phone]. Patient and family notified, time was given for answering questions    Clinical  IMPRESSION    1. Thoracic spine tumor    2. Lung neoplasm    3. Acute midline low back pain, unspecified whether sciatica present    4.

## 2020-01-25 NOTE — ED NOTES
Pt given ice water per request and per ok from Dr. Daphnie Dumont. Pt medicated for increasing pain. Pt denies further needs at this time. CCM and CPOX in place. Call light in reach. Family at [facility]. RN will cont. To monitor.       Ruth Harley RN  01/25/20 0090

## 2020-01-25 NOTE — ED NOTES
Radiology notified to push imaging to [facility] ED.       Polo White RN  01/25/20 9562

## 2020-01-25 NOTE — ED PROVIDER NOTES
I independently evaluated and obtained a history and physical on Gonzales Watkins. All diagnostic, treatment, and disposition assistants were made to myself in conjunction the advanced practice provider. For further details of this patient's emergency department encounter, please see the advanced practice provider's documentation. History: Patient presents with bilateral leg numbness and weakness this morning. Onset was over 300. Symmetrical.  Feels like her legs are not able to hold her weight. No bowel or bladder incontinence. Physician Exam: Midline TTP at midline thoracic spine. Hyporeflexic. 3+ strength hip flexors bilaterally, not able to keep antigravity >5s. Sensation intact. MDM: Discussed medical decision making with PATRICIA and agree with plan. Please see their note for further detail. Following signout from advanced practice provider, I performed every 2 hour neuro exams on the patient to ensure that there was no worsening of neurologic symptoms that might require emergent surgery. Patient continues to have 3+ bilateral strength in her lower extremities. Initially she concern to me with report that she had not urinated at all today which could be as a result of  urinary retention from spinal intrusion, however patient was able to urinate good amount later on in her stay.         Ann-Marie Ta MD  01/25/20 0063

## 2020-01-26 LAB
EKG ATRIAL RATE: 78 BPM
EKG DIAGNOSIS: NORMAL
EKG P AXIS: 21 DEGREES
EKG P-R INTERVAL: 142 MS
EKG Q-T INTERVAL: 412 MS
EKG QRS DURATION: 74 MS
EKG QTC CALCULATION (BAZETT): 469 MS
EKG R AXIS: -5 DEGREES
EKG T AXIS: 24 DEGREES
EKG VENTRICULAR RATE: 78 BPM

## 2020-01-28 RX ORDER — FAMOTIDINE 20 MG/1
TABLET, FILM COATED ORAL
Qty: 60 TABLET | Refills: 0 | Status: SHIPPED | OUTPATIENT
Start: 2020-01-28 | End: 2020-02-24

## 2020-02-24 RX ORDER — FAMOTIDINE 20 MG/1
TABLET, FILM COATED ORAL
Qty: 60 TABLET | Refills: 0 | Status: SHIPPED | OUTPATIENT
Start: 2020-02-24

## 2020-05-01 RX ORDER — METOPROLOL SUCCINATE 50 MG/1
TABLET, EXTENDED RELEASE ORAL
Qty: 90 TABLET | Refills: 3 | Status: SHIPPED | OUTPATIENT
Start: 2020-05-01

## 2020-06-02 ENCOUNTER — HOSPITAL ENCOUNTER (EMERGENCY)
Age: 80
Discharge: ANOTHER HOSPITAL | End: 2020-06-03
Attending: EMERGENCY MEDICINE
Payer: MEDICARE

## 2020-06-02 ENCOUNTER — APPOINTMENT (OUTPATIENT)
Dept: GENERAL RADIOLOGY | Age: 80
End: 2020-06-02
Payer: MEDICARE

## 2020-06-02 LAB
ALBUMIN SERPL-MCNC: 3.8 GM/DL (ref 3.4–5)
ALP BLD-CCNC: 81 IU/L (ref 40–129)
ALT SERPL-CCNC: 14 U/L (ref 10–40)
ANION GAP SERPL CALCULATED.3IONS-SCNC: 19 MMOL/L (ref 4–16)
AST SERPL-CCNC: 13 IU/L (ref 15–37)
BASE EXCESS: 3 (ref 0–2.4)
BASOPHILIC STIPPLING: ABNORMAL
BILIRUB SERPL-MCNC: 0.7 MG/DL (ref 0–1)
BUN BLDV-MCNC: 9 MG/DL (ref 6–23)
CALCIUM SERPL-MCNC: 8.1 MG/DL (ref 8.3–10.6)
CELLS COUNTED: 25
CHLORIDE BLD-SCNC: 91 MMOL/L (ref 99–110)
CO2: 18 MMOL/L (ref 21–32)
COMMENT: ABNORMAL
CREAT SERPL-MCNC: 0.5 MG/DL (ref 0.6–1.1)
DIFFERENTIAL TYPE: ABNORMAL
FERRITIN: 1123 NG/ML (ref 15–150)
GFR AFRICAN AMERICAN: >60 ML/MIN/1.73M2
GFR NON-AFRICAN AMERICAN: >60 ML/MIN/1.73M2
GLUCOSE BLD-MCNC: 104 MG/DL (ref 70–99)
HCO3 VENOUS: 20.1 MMOL/L (ref 19–25)
HCT VFR BLD CALC: 24 % (ref 37–47)
HEMOGLOBIN: 7.8 GM/DL (ref 12.5–16)
LACTATE DEHYDROGENASE: 169 IU/L (ref 120–246)
LACTATE: 6 MMOL/L (ref 0.4–2)
LYMPHOCYTES ABSOLUTE: 0.1 K/CU MM
LYMPHOCYTES RELATIVE PERCENT: 12 % (ref 24–44)
MCH RBC QN AUTO: 30.8 PG (ref 27–31)
MCHC RBC AUTO-ENTMCNC: 32.5 % (ref 32–36)
MCV RBC AUTO: 94.9 FL (ref 78–100)
MONOCYTES ABSOLUTE: 0.3 K/CU MM
MONOCYTES RELATIVE PERCENT: 28 % (ref 0–4)
NUCLEATED RED BLOOD CELLS: 32
O2 SAT, VEN: 94.5 % (ref 50–70)
PCO2, VEN: 31 MMHG (ref 38–52)
PDW BLD-RTO: 16.1 % (ref 11.7–14.9)
PH VENOUS: 7.42 (ref 7.32–7.42)
PLATELET # BLD: 57 K/CU MM (ref 140–440)
PLT MORPHOLOGY: ABNORMAL
PMV BLD AUTO: 12.3 FL (ref 7.5–11.1)
PO2, VEN: 161 MMHG (ref 28–48)
POTASSIUM SERPL-SCNC: 4.6 MMOL/L (ref 3.5–5.1)
PRO-BNP: 288.2 PG/ML
PROCALCITONIN: 0.33
RBC # BLD: 2.53 M/CU MM (ref 4.2–5.4)
SARS-COV-2, NAAT: NORMAL
SEGMENTED NEUTROPHILS ABSOLUTE COUNT: 0.5 K/CU MM
SEGMENTED NEUTROPHILS RELATIVE PERCENT: 60 % (ref 36–66)
SODIUM BLD-SCNC: 128 MMOL/L (ref 135–145)
SOURCE: NORMAL
TOTAL PROTEIN: 5.9 GM/DL (ref 6.4–8.2)
TROPONIN T: <0.01 NG/ML
WBC # BLD: 0.9 K/CU MM (ref 4–10.5)

## 2020-06-02 PROCEDURE — 82728 ASSAY OF FERRITIN: CPT

## 2020-06-02 PROCEDURE — 71045 X-RAY EXAM CHEST 1 VIEW: CPT

## 2020-06-02 PROCEDURE — 82805 BLOOD GASES W/O2 SATURATION: CPT

## 2020-06-02 PROCEDURE — 80053 COMPREHEN METABOLIC PANEL: CPT

## 2020-06-02 PROCEDURE — 84484 ASSAY OF TROPONIN QUANT: CPT

## 2020-06-02 PROCEDURE — 86141 C-REACTIVE PROTEIN HS: CPT

## 2020-06-02 PROCEDURE — 87186 SC STD MICRODIL/AGAR DIL: CPT

## 2020-06-02 PROCEDURE — 93005 ELECTROCARDIOGRAM TRACING: CPT | Performed by: PHYSICIAN ASSISTANT

## 2020-06-02 PROCEDURE — 2580000003 HC RX 258: Performed by: PHYSICIAN ASSISTANT

## 2020-06-02 PROCEDURE — 87150 DNA/RNA AMPLIFIED PROBE: CPT

## 2020-06-02 PROCEDURE — 99284 EMERGENCY DEPT VISIT MOD MDM: CPT

## 2020-06-02 PROCEDURE — 84145 PROCALCITONIN (PCT): CPT

## 2020-06-02 PROCEDURE — 87040 BLOOD CULTURE FOR BACTERIA: CPT

## 2020-06-02 PROCEDURE — 85007 BL SMEAR W/DIFF WBC COUNT: CPT

## 2020-06-02 PROCEDURE — 2580000003 HC RX 258: Performed by: EMERGENCY MEDICINE

## 2020-06-02 PROCEDURE — 83880 ASSAY OF NATRIURETIC PEPTIDE: CPT

## 2020-06-02 PROCEDURE — 6370000000 HC RX 637 (ALT 250 FOR IP): Performed by: PHYSICIAN ASSISTANT

## 2020-06-02 PROCEDURE — 83605 ASSAY OF LACTIC ACID: CPT

## 2020-06-02 PROCEDURE — U0002 COVID-19 LAB TEST NON-CDC: HCPCS

## 2020-06-02 PROCEDURE — 96365 THER/PROPH/DIAG IV INF INIT: CPT

## 2020-06-02 PROCEDURE — 85027 COMPLETE CBC AUTOMATED: CPT

## 2020-06-02 PROCEDURE — 96368 THER/DIAG CONCURRENT INF: CPT

## 2020-06-02 PROCEDURE — 6360000002 HC RX W HCPCS: Performed by: PHYSICIAN ASSISTANT

## 2020-06-02 PROCEDURE — 83615 LACTATE (LD) (LDH) ENZYME: CPT

## 2020-06-02 RX ORDER — 0.9 % SODIUM CHLORIDE 0.9 %
1000 INTRAVENOUS SOLUTION INTRAVENOUS ONCE
Status: COMPLETED | OUTPATIENT
Start: 2020-06-02 | End: 2020-06-03

## 2020-06-02 RX ORDER — ACETAMINOPHEN 500 MG
1000 TABLET ORAL ONCE
Status: COMPLETED | OUTPATIENT
Start: 2020-06-02 | End: 2020-06-02

## 2020-06-02 RX ORDER — VANCOMYCIN HYDROCHLORIDE 1 G/200ML
1000 INJECTION, SOLUTION INTRAVENOUS ONCE
Status: COMPLETED | OUTPATIENT
Start: 2020-06-02 | End: 2020-06-03

## 2020-06-02 RX ADMIN — VANCOMYCIN HYDROCHLORIDE 1000 MG: 1 INJECTION, SOLUTION INTRAVENOUS at 23:40

## 2020-06-02 RX ADMIN — ACETAMINOPHEN 1000 MG: 500 TABLET ORAL at 23:30

## 2020-06-02 RX ADMIN — CEFEPIME 2 G: 2 INJECTION, POWDER, FOR SOLUTION INTRAVENOUS at 23:30

## 2020-06-02 RX ADMIN — SODIUM CHLORIDE 1000 ML: 9 INJECTION, SOLUTION INTRAVENOUS at 23:30

## 2020-06-02 ASSESSMENT — PAIN SCALES - GENERAL: PAINLEVEL_OUTOF10: 4

## 2020-06-03 VITALS
SYSTOLIC BLOOD PRESSURE: 100 MMHG | HEART RATE: 117 BPM | HEIGHT: 63 IN | TEMPERATURE: 101.2 F | DIASTOLIC BLOOD PRESSURE: 43 MMHG | BODY MASS INDEX: 29.41 KG/M2 | RESPIRATION RATE: 16 BRPM | OXYGEN SATURATION: 94 % | WEIGHT: 166 LBS

## 2020-06-03 LAB
HIGH SENSITIVE C-REACTIVE PROTEIN: 31.1 MG/L
LACTATE: 3.1 MMOL/L (ref 0.4–2)

## 2020-06-03 NOTE — ED TRIAGE NOTES
PT came to ED via [facility] from [address] [place] [address]. PT receiving 10 lmp via NRB. PT complains of chills. PT stated this SOB started around 8:30pm tonight.

## 2020-06-04 LAB
SARS-COV-2: NOT DETECTED
SOURCE: NORMAL

## 2020-06-05 LAB
CULTURE: ABNORMAL
CULTURE: ABNORMAL
Lab: ABNORMAL
SPECIMEN: ABNORMAL

## 2020-06-07 LAB
CULTURE: NORMAL
Lab: NORMAL
SPECIMEN: NORMAL

## 2020-06-09 LAB
EKG ATRIAL RATE: 139 BPM
EKG DIAGNOSIS: NORMAL
EKG P AXIS: 36 DEGREES
EKG P-R INTERVAL: 132 MS
EKG Q-T INTERVAL: 284 MS
EKG QRS DURATION: 66 MS
EKG QTC CALCULATION (BAZETT): 432 MS
EKG R AXIS: 15 DEGREES
EKG T AXIS: 86 DEGREES
EKG VENTRICULAR RATE: 139 BPM

## 2020-07-01 ENCOUNTER — HOSPITAL ENCOUNTER (EMERGENCY)
Age: 80
Discharge: NURSING, REHAB OR LONG-TERM CARE | End: 2020-07-01
Attending: EMERGENCY MEDICINE
Payer: MEDICARE

## 2020-07-01 ENCOUNTER — APPOINTMENT (OUTPATIENT)
Dept: GENERAL RADIOLOGY | Age: 80
End: 2020-07-01
Payer: MEDICARE

## 2020-07-01 VITALS
WEIGHT: 130 LBS | HEART RATE: 102 BPM | DIASTOLIC BLOOD PRESSURE: 81 MMHG | HEIGHT: 62 IN | SYSTOLIC BLOOD PRESSURE: 152 MMHG | OXYGEN SATURATION: 98 % | TEMPERATURE: 98.3 F | BODY MASS INDEX: 23.92 KG/M2 | RESPIRATION RATE: 16 BRPM

## 2020-07-01 PROCEDURE — 73620 X-RAY EXAM OF FOOT: CPT

## 2020-07-01 PROCEDURE — 73502 X-RAY EXAM HIP UNI 2-3 VIEWS: CPT

## 2020-07-01 PROCEDURE — 6370000000 HC RX 637 (ALT 250 FOR IP): Performed by: EMERGENCY MEDICINE

## 2020-07-01 PROCEDURE — 73560 X-RAY EXAM OF KNEE 1 OR 2: CPT

## 2020-07-01 PROCEDURE — 99285 EMERGENCY DEPT VISIT HI MDM: CPT

## 2020-07-01 RX ORDER — ACETAMINOPHEN 325 MG/1
650 TABLET ORAL ONCE
Status: COMPLETED | OUTPATIENT
Start: 2020-07-01 | End: 2020-07-01

## 2020-07-01 RX ADMIN — ACETAMINOPHEN 650 MG: 325 TABLET ORAL at 06:33

## 2020-07-01 ASSESSMENT — PAIN DESCRIPTION - ORIENTATION
ORIENTATION: LEFT
ORIENTATION: LEFT

## 2020-07-01 ASSESSMENT — PAIN DESCRIPTION - PAIN TYPE: TYPE: ACUTE PAIN

## 2020-07-01 ASSESSMENT — PAIN SCALES - GENERAL
PAINLEVEL_OUTOF10: 4
PAINLEVEL_OUTOF10: 5
PAINLEVEL_OUTOF10: 5

## 2020-07-01 ASSESSMENT — PAIN DESCRIPTION - LOCATION
LOCATION: HIP
LOCATION: HIP

## 2020-07-01 NOTE — ED NOTES
Bed: ED-20  Expected date:   Expected time:   Means of arrival:   Comments:  EMS      Tali Rai RN  07/01/20 8387

## 2020-07-01 NOTE — ED NOTES
Call [facility] at [phone] and gave report to nurse, [facility].       Bobby Montelongo, JIMMY  07/01/20 7835

## 2020-07-01 NOTE — ED TRIAGE NOTES
Brought in by EMS [facility]. Medics report approx 2 ft drop. Pt c/o L hip pain. No shortening or rotation noted. Mentioned pt had fallen earlier yesterday as well. injuring R toes.

## 2020-07-01 NOTE — ED PROVIDER NOTES
Triage Chief Complaint:   Fall (from [facility], fell out of bed. Medics report approx 2 ft)    [place]:  Ana Jones is a 78 y.o. female that presents following a fall. Patient was in baseline state of health until this morning when she says she \"woke up on the ground\". Patient does not know why she was on the ground. Patient reports that she is essentially nonambulatory at baseline secondary to metastatic cancer. Patient reports primarily left hip pain since the fall that is currently 5 out of 10, constant and localized to the lateral aspect of hip. Patient does report a prior left hip replacement. Patient did not hit her head or lose consciousness. Patient is not sure she is on any anticoagulation. No new numbness or weakness. No new neck or back pain. Patient does describe chronic pain secondary to metastatic cancer. Patient did just finish chemotherapy. Patient follows at [facility] for this.     ROS:  General:  No fevers, no chills, no weakness  Eyes:  No recent vison changes, no discharge  ENT:  No difficulty swallowing, no blood from nose, no hearing changes  Cardiovascular:  No chest pain, no palpitations  Respiratory:  No shortness of breath, no coughing up blood, no wheezing  Gastrointestinal:  No pain, no nausea, no vomiting, no diarrhea  Musculoskeletal:  No muscle pain, + joint pain, no back pain  Skin:  No rash, no cuts, no easy bruising  Neurologic:  No speech problems, no headache, no extremity numbness, no extremity tingling, no extremity weakness  Psychiatric:  No anxiety  Genitourinary:  No dysuria, no hematuria  Extremities:  no edema, + pain    Past Medical History:   Diagnosis Date    Cancer ([address]     Reports \"all over\" and spine    Closed compression fracture of thoracic vertebra ([address] 1/17/2020    DDD (degenerative disc disease), lumbar     Diverticulosis of colon     left colon    DJD (degenerative joint disease) of lumbar spine     DJD (degenerative joint disease), lumbosacral     Family history of type 2 diabetes mellitus     father    Fatty liver     Gastroesophageal reflux disease with hiatal hernia     Hypertension     Intention tremor     Left knee DJD 12-    mild per Xray    Lumbar spinal stenosis     Mass of right lung 1/17/2020    Osteopenia     Screening colonoscopy     diverticulosis,small internal hemorrhoids, otherwise normal-10year follow up recommended by Dr Roel Red     Past Surgical History:   Procedure Laterality Date    CHOLECYSTECTOMY  2006    COLONOSCOPY  7/6/2015    diverticulosis, polyp removal, internal hemorrhoids    HIP SURGERY Left 11/01/2019    LUMBAR LAMINECTOMY  1990    L4-5 herniated disc resection    TUBAL LIGATION  1974     Family History   Problem Relation Age of Onset    Stroke Mother     Heart Failure Mother         d. age 80 -CHF    Diabetes Father         d. age 80   [de-identified] Other Brother         Lior-Parkison-White syndrome     Social History     Socioeconomic History    Marital status:       Spouse name: Not on file    Number of children: Not on file    Years of education: Not on file    Highest education level: Not on file   Occupational History    Occupation: RETIRED TEACHER   Social Needs    Financial resource strain: Not on file    Food insecurity     Worry: Not on file     Inability: Not on file    Transportation needs     Medical: Not on file     Non-medical: Not on file   Tobacco Use    Smoking status: Never Smoker    Smokeless tobacco: Never Used   Substance and Sexual Activity    Alcohol use: No     Comment: avoids caffeine    Drug use: No    Sexual activity: Not on file   Lifestyle    Physical activity     Days per week: Not on file     Minutes per session: Not on file    Stress: Not on file   Relationships    Social connections     Talks on phone: Not on file     Gets together: Not on file     Attends Orthodox service: Not on file     Active member of club or organization: Not on file     Attends meetings of clubs or organizations: Not on file     Relationship status: Not on file    Intimate partner violence     Fear of current or ex partner: Not on file     Emotionally abused: Not on file     Physically abused: Not on file     Forced sexual activity: Not on file   Other Topics Concern    Not on file   Social History Narrative    Not on file     No current facility-administered medications for this encounter. Current Outpatient Medications   Medication Sig Dispense Refill    metoprolol succinate (TOPROL XL) 50 MG extended release tablet TAKE 1 TABLET DAILY 90 tablet 3    famotidine (PEPCID) 20 MG tablet TAKE ONE TABLET BY MOUTH TWICE A DAY 60 tablet 0    sertraline (ZOLOFT) 25 MG tablet Take 1 tablet by mouth nightly 30 tablet 3    melatonin 1 MG tablet Take 10 tablets by mouth nightly 10 tablet 0    traMADol (ULTRAM) 50 MG tablet Take 50 mg by mouth every 6 hours as needed for Pain. 200 mg in the morning, 100 at 5 pm, and 50 or 100 at bedtime      gabapentin (NEURONTIN) 300 MG capsule TAKE 3 CAPSULES DAILY AT 5:00 P. M. AND TAKE 3 CAPSULES AT BEDTIME (Patient taking differently: 300 mg.  Take 1 in the morning, 1 at [address], and 1 at Bedtime) 540 capsule 1    lisinopril (PRINIVIL;ZESTRIL) 10 MG tablet TAKE 1 TABLET DAILY IN THE EVENING FOR BLOOD PRESSURE 90 tablet 3    rOPINIRole (REQUIP) 1 MG tablet TAKE ONE AND ONE-HALF TABLETS AT NIGHT 150 tablet 3     Allergies   Allergen Reactions    Adhesive Tape Other (See Comments) and Rash    Actonel [Risedronate Sodium]     Evista [Raloxifene Hydrochloride]     Fosamax [Alendronate Sodium]     Medrol [Methylprednisolone]      Medrol dosepak makes very jittery    Lansoprazole Rash    Penicillins Rash    Prevacid [Lansoprazole] Rash       Nursing Notes Reviewed    Physical Exam:  ED Triage Vitals   Enc Vitals Group      BP 07/01/20 0543 125/67      Pulse 07/01/20 0543 94      Resp 07/01/20 0543 16      Temp --       Temp src -- SpO2 07/01/20 0543 98 %      Weight 07/01/20 0538 130 lb (59 kg)      Height 07/01/20 0538 5' 2\" (1.575 m)      Head Circumference --       Peak Flow --       Pain Score --       Pain Loc --       Pain Edu? --       Excl. in [address]? --        My pulse ox interpretation is - 100% on RA    General appearance:  No acute distress. Sitting upright in bed. Appears nontoxic. Skin:  Warm. Dry. No contusions or abrasions noted to the left hip. There is a small circular contusion to the anterior left knee and a contusion to the dorsal aspect of the distal right foot laterally. Small abrasions to the toes of the right foot. Eye:  Extraocular movements intact. Ears, nose, mouth and throat:  No cephalohematoma, espana sign or raccoon eyes. Midface is stable. No dental malocclusion. Neck:  Trachea midline. No midline bony cervical tenderness. Extremity:  No swelling. Normal ROM. No gross deformity ×4 extremities. Extremities are nontender. Heart:  Regular rate and rhythm, normal S1 & S2, no extra heart sounds. Perfusion:  Intact. Strong distal pulses to bilateral arms and legs. Respiratory:  Lungs clear to auscultation bilaterally. Respirations nonlabored. Chest wall is nontender. No crepitance. Abdominal:  Normal bowel sounds. Soft. Nontender. Non distended. Back:  No new midline bony TLS tenderness (patient reports chronic tenderness). Neurological:  Alert and oriented times 3. No focal neuro deficits. Sensation intact to light touch to distal upper/lower extremities; 5/5 and symmetric  and dorsi/plantar flexion. No saddle anesthesia. Psychiatric:  Appropriate    I have reviewed and interpreted all of the currently available lab results from this visit (if applicable):  No results found for this visit on 07/01/20.    Radiographs (if obtained):  [] The following radiograph was interpreted by myself in the absence of a radiologist:   [x] Radiologist's Report Reviewed:  XR KNEE LEFT (1-2 VIEWS)   Preliminary Result   1. Mineralized density along the medial femoral condyle of the left knee   which may be related to an age-indeterminate avulsion injury versus calcific   tendonitis. Findings can be correlated with focal area of pain. Otherwise   no acute osseous abnormality in the left knee. 2. Postsurgical changes from left hip arthroplasty. Degenerative changes of   the right hip joint. No new acute osseous abnormality within the pelvis or   left hip. XR HIP 2-3 VW W PELVIS LEFT   Preliminary Result   1. Mineralized density along the medial femoral condyle of the left knee   which may be related to an age-indeterminate avulsion injury versus calcific   tendonitis. Findings can be correlated with focal area of pain. Otherwise   no acute osseous abnormality in the left knee. 2. Postsurgical changes from left hip arthroplasty. Degenerative changes of   the right hip joint. No new acute osseous abnormality within the pelvis or   left hip. XR FOOT RIGHT (2 VIEWS)   Final Result   Osteopenia. No acute osseous abnormality in the right foot. Dorsal midfoot soft tissue swelling. EKG (if obtained): (All EKG's are interpreted by myself in the absence of a cardiologist)    Chart review shows recent radiographs:  Xr Chest Portable    Result Date: 6/2/2020  EXAMINATION: ONE XRAY VIEW OF THE CHEST 6/2/2020 9:53 pm COMPARISON: 01/22/2020 HISTORY: ORDERING SYSTEM PROVIDED HISTORY: sob TECHNOLOGIST PROVIDED HISTORY: Reason for exam:->sob Reason for Exam: sob Acuity: Acute Type of Exam: Initial Additional signs and symptoms: na Relevant Medical/Surgical History: na FINDINGS: Monitor wires project over the thorax. Rotated chest.  Right PICC tip at the cavoatrial junction. Decrease in paraspinal opacity compared to prior study. No focal airspace consolidation. No significant pleural effusion or pneumothorax. Heart size is within normal limits.   Pulmonary vascularity is normal.     No acute cardiopulmonary abnormality. Decrease in size of paraspinal opacity/mass. MDM:  Pt presents as above. Emergent conditions considered. Presentation prompted initial imaging as above. Tylenol given for pain. New dressings were placed on patient's toes where there are abrasions. X-ray imaging does not demonstrate any acute fracture. There is a possible age-indeterminate avulsion injury versus a calcific tendinitis on patient's left medial femoral condyle and patient is not tender in this area so I suspect no fracture. Patient's hardware is intact to her left hip. Patient will be discharged back to her living facility. I discussed specific signs and symptoms on when to return to the emergency department as well as the need for close outpatient follow-up. Questions sought and answered with the patient. They voice understanding and agree with plan. Clinical Impression:  1. Fall from bed, initial encounter    2. Contusion of right foot, initial encounter    3. Contusion of left hip, initial encounter    4. Contusion of left knee, initial encounter      Disposition referral (if applicable):  Romie Mancia DO  [facility] [phone]    Schedule an appointment as soon as possible for a visit       [facility] Emergency Department  [address] [phone]  [phone]  Today  If symptoms worsen    Disposition medications (if applicable):  New Prescriptions    No medications on file       Comment: Please note this report has been produced using speech recognition software and may contain errors related to that system including errors in grammar, punctuation, and spelling, as well as words and phrases that may be inappropriate. If there are any questions or concerns please feel free to contact the dictating provider for clarification.        Leah Lang, MD  07/01/20 5452

## 2020-07-01 NOTE — ED NOTES
Pt laying on cot, denies needs.  Call light in reach     [facility], RN  07/01/20 0900

## 2020-07-01 NOTE — ED NOTES
Contacted daughter, Judith Kim, at [phone] to update on mother's status.       Hector Vera RN  07/01/20 8744

## 2020-07-07 ENCOUNTER — APPOINTMENT (OUTPATIENT)
Dept: GENERAL RADIOLOGY | Age: 80
End: 2020-07-07
Payer: MEDICARE

## 2020-07-07 ENCOUNTER — APPOINTMENT (OUTPATIENT)
Dept: CT IMAGING | Age: 80
End: 2020-07-07
Payer: MEDICARE

## 2020-07-07 ENCOUNTER — HOSPITAL ENCOUNTER (OUTPATIENT)
Age: 80
Setting detail: OBSERVATION
Discharge: HOME | End: 2020-07-07
Attending: EMERGENCY MEDICINE | Admitting: INTERNAL MEDICINE
Payer: MEDICARE

## 2020-07-07 VITALS
HEART RATE: 96 BPM | WEIGHT: 130 LBS | RESPIRATION RATE: 14 BRPM | HEIGHT: 62 IN | DIASTOLIC BLOOD PRESSURE: 91 MMHG | OXYGEN SATURATION: 98 % | TEMPERATURE: 98.9 F | SYSTOLIC BLOOD PRESSURE: 168 MMHG | BODY MASS INDEX: 23.92 KG/M2

## 2020-07-07 LAB
ALBUMIN SERPL-MCNC: 4.1 GM/DL (ref 3.4–5)
ALP BLD-CCNC: 76 IU/L (ref 40–128)
ALT SERPL-CCNC: 17 U/L (ref 10–40)
ANION GAP SERPL CALCULATED.3IONS-SCNC: 10 MMOL/L (ref 4–16)
APTT: 37.9 SECONDS (ref 25.1–37.1)
AST SERPL-CCNC: 26 IU/L (ref 15–37)
BASE EXCESS MIXED: 1.7 (ref 0–2.3)
BASOPHILS ABSOLUTE: 0 K/CU MM
BASOPHILS RELATIVE PERCENT: 0.2 % (ref 0–1)
BILIRUB SERPL-MCNC: 0.7 MG/DL (ref 0–1)
BUN BLDV-MCNC: 9 MG/DL (ref 6–23)
CALCIUM SERPL-MCNC: 9.1 MG/DL (ref 8.3–10.6)
CHLORIDE BLD-SCNC: 92 MMOL/L (ref 99–110)
CO2: 22 MMOL/L (ref 21–32)
COMMENT: ABNORMAL
CREAT SERPL-MCNC: 0.4 MG/DL (ref 0.6–1.1)
DIFFERENTIAL TYPE: ABNORMAL
EOSINOPHILS ABSOLUTE: 0.1 K/CU MM
EOSINOPHILS RELATIVE PERCENT: 1.5 % (ref 0–3)
GFR AFRICAN AMERICAN: >60 ML/MIN/1.73M2
GFR NON-AFRICAN AMERICAN: >60 ML/MIN/1.73M2
GLUCOSE BLD-MCNC: 111 MG/DL (ref 70–99)
HCO3 VENOUS: 24.4 MMOL/L (ref 19–25)
HCT VFR BLD CALC: 34.8 % (ref 37–47)
HEMOGLOBIN: 11 GM/DL (ref 12.5–16)
IMMATURE NEUTROPHIL %: 2 % (ref 0–0.43)
INR BLD: 2.23 INDEX
LACTATE: 1.3 MMOL/L (ref 0.4–2)
LIPASE: 10 IU/L (ref 13–60)
LYMPHOCYTES ABSOLUTE: 1.2 K/CU MM
LYMPHOCYTES RELATIVE PERCENT: 19.4 % (ref 24–44)
MAGNESIUM: 1.9 MG/DL (ref 1.8–2.4)
MCH RBC QN AUTO: 29.4 PG (ref 27–31)
MCHC RBC AUTO-ENTMCNC: 31.6 % (ref 32–36)
MCV RBC AUTO: 93 FL (ref 78–100)
MONOCYTES ABSOLUTE: 0.9 K/CU MM
MONOCYTES RELATIVE PERCENT: 15 % (ref 0–4)
NUCLEATED RBC %: 0 %
O2 SAT, VEN: 93.2 % (ref 50–70)
PCO2, VEN: 32 MMHG (ref 38–52)
PDW BLD-RTO: 16.3 % (ref 11.7–14.9)
PH VENOUS: 7.49 (ref 7.32–7.42)
PLATELET # BLD: 187 K/CU MM (ref 140–440)
PMV BLD AUTO: 10.8 FL (ref 7.5–11.1)
PO2, VEN: 249 MMHG (ref 28–48)
POTASSIUM SERPL-SCNC: 4.2 MMOL/L (ref 3.5–5.1)
PRO-BNP: 725.1 PG/ML
PROTHROMBIN TIME: 27.2 SECONDS (ref 11.7–14.5)
RBC # BLD: 3.74 M/CU MM (ref 4.2–5.4)
SEGMENTED NEUTROPHILS ABSOLUTE COUNT: 3.7 K/CU MM
SEGMENTED NEUTROPHILS RELATIVE PERCENT: 61.9 % (ref 36–66)
SODIUM BLD-SCNC: 124 MMOL/L (ref 135–145)
TOTAL CK: 48 IU/L (ref 26–140)
TOTAL IMMATURE NEUTOROPHIL: 0.12 K/CU MM
TOTAL NUCLEATED RBC: 0 K/CU MM
TOTAL PROTEIN: 6.2 GM/DL (ref 6.4–8.2)
TROPONIN T: <0.01 NG/ML
TSH HIGH SENSITIVITY: 3.03 UIU/ML (ref 0.27–4.2)
WBC # BLD: 5.9 K/CU MM (ref 4–10.5)

## 2020-07-07 PROCEDURE — 84484 ASSAY OF TROPONIN QUANT: CPT

## 2020-07-07 PROCEDURE — 93005 ELECTROCARDIOGRAM TRACING: CPT | Performed by: EMERGENCY MEDICINE

## 2020-07-07 PROCEDURE — 84443 ASSAY THYROID STIM HORMONE: CPT

## 2020-07-07 PROCEDURE — 76376 3D RENDER W/INTRP POSTPROCES: CPT

## 2020-07-07 PROCEDURE — 83605 ASSAY OF LACTIC ACID: CPT

## 2020-07-07 PROCEDURE — 85610 PROTHROMBIN TIME: CPT

## 2020-07-07 PROCEDURE — 74176 CT ABD & PELVIS W/O CONTRAST: CPT

## 2020-07-07 PROCEDURE — 2580000003 HC RX 258: Performed by: EMERGENCY MEDICINE

## 2020-07-07 PROCEDURE — 83690 ASSAY OF LIPASE: CPT

## 2020-07-07 PROCEDURE — 99285 EMERGENCY DEPT VISIT HI MDM: CPT

## 2020-07-07 PROCEDURE — 82550 ASSAY OF CK (CPK): CPT

## 2020-07-07 PROCEDURE — 96361 HYDRATE IV INFUSION ADD-ON: CPT

## 2020-07-07 PROCEDURE — 83735 ASSAY OF MAGNESIUM: CPT

## 2020-07-07 PROCEDURE — 6370000000 HC RX 637 (ALT 250 FOR IP)

## 2020-07-07 PROCEDURE — 73501 X-RAY EXAM HIP UNI 1 VIEW: CPT

## 2020-07-07 PROCEDURE — 85025 COMPLETE CBC W/AUTO DIFF WBC: CPT

## 2020-07-07 PROCEDURE — 96360 HYDRATION IV INFUSION INIT: CPT

## 2020-07-07 PROCEDURE — 80053 COMPREHEN METABOLIC PANEL: CPT

## 2020-07-07 PROCEDURE — 73502 X-RAY EXAM HIP UNI 2-3 VIEWS: CPT

## 2020-07-07 PROCEDURE — G0378 HOSPITAL OBSERVATION PER HR: HCPCS

## 2020-07-07 PROCEDURE — 82805 BLOOD GASES W/O2 SATURATION: CPT

## 2020-07-07 PROCEDURE — 85730 THROMBOPLASTIN TIME PARTIAL: CPT

## 2020-07-07 PROCEDURE — 6370000000 HC RX 637 (ALT 250 FOR IP): Performed by: EMERGENCY MEDICINE

## 2020-07-07 PROCEDURE — 71045 X-RAY EXAM CHEST 1 VIEW: CPT

## 2020-07-07 PROCEDURE — 83880 ASSAY OF NATRIURETIC PEPTIDE: CPT

## 2020-07-07 PROCEDURE — 72128 CT CHEST SPINE W/O DYE: CPT

## 2020-07-07 PROCEDURE — 87040 BLOOD CULTURE FOR BACTERIA: CPT

## 2020-07-07 PROCEDURE — 70450 CT HEAD/BRAIN W/O DYE: CPT

## 2020-07-07 RX ORDER — SODIUM CHLORIDE 9 MG/ML
INJECTION, SOLUTION INTRAVENOUS CONTINUOUS
Status: CANCELLED | OUTPATIENT
Start: 2020-07-07

## 2020-07-07 RX ORDER — TRAMADOL HYDROCHLORIDE 50 MG/1
50 TABLET ORAL ONCE
Status: COMPLETED | OUTPATIENT
Start: 2020-07-07 | End: 2020-07-07

## 2020-07-07 RX ORDER — TRAMADOL HYDROCHLORIDE 50 MG/1
TABLET ORAL
Status: COMPLETED
Start: 2020-07-07 | End: 2020-07-07

## 2020-07-07 RX ORDER — SODIUM CHLORIDE 9 MG/ML
INJECTION, SOLUTION INTRAVENOUS CONTINUOUS
Status: DISCONTINUED | OUTPATIENT
Start: 2020-07-07 | End: 2020-07-08 | Stop reason: HOSPADM

## 2020-07-07 RX ORDER — SODIUM CHLORIDE 0.9 % (FLUSH) 0.9 %
10 SYRINGE (ML) INJECTION PRN
Status: CANCELLED | OUTPATIENT
Start: 2020-07-07

## 2020-07-07 RX ORDER — 0.9 % SODIUM CHLORIDE 0.9 %
1000 INTRAVENOUS SOLUTION INTRAVENOUS ONCE
Status: COMPLETED | OUTPATIENT
Start: 2020-07-07 | End: 2020-07-07

## 2020-07-07 RX ORDER — ACETAMINOPHEN 650 MG/1
650 SUPPOSITORY RECTAL EVERY 6 HOURS PRN
Status: CANCELLED | OUTPATIENT
Start: 2020-07-07

## 2020-07-07 RX ORDER — ACETAMINOPHEN 325 MG/1
650 TABLET ORAL EVERY 6 HOURS PRN
Status: CANCELLED | OUTPATIENT
Start: 2020-07-07

## 2020-07-07 RX ORDER — LIDOCAINE 4 G/G
1 PATCH TOPICAL DAILY
Status: DISCONTINUED | OUTPATIENT
Start: 2020-07-07 | End: 2020-07-08 | Stop reason: HOSPADM

## 2020-07-07 RX ORDER — ONDANSETRON 2 MG/ML
4 INJECTION INTRAMUSCULAR; INTRAVENOUS EVERY 6 HOURS PRN
Status: CANCELLED | OUTPATIENT
Start: 2020-07-07

## 2020-07-07 RX ORDER — HEPARIN SODIUM 5000 [USP'U]/ML
5000 INJECTION, SOLUTION INTRAVENOUS; SUBCUTANEOUS EVERY 8 HOURS SCHEDULED
Status: CANCELLED | OUTPATIENT
Start: 2020-07-07

## 2020-07-07 RX ORDER — POLYETHYLENE GLYCOL 3350 17 G/17G
17 POWDER, FOR SOLUTION ORAL DAILY PRN
Status: CANCELLED | OUTPATIENT
Start: 2020-07-07

## 2020-07-07 RX ORDER — PROMETHAZINE HYDROCHLORIDE 25 MG/1
12.5 TABLET ORAL EVERY 6 HOURS PRN
Status: CANCELLED | OUTPATIENT
Start: 2020-07-07

## 2020-07-07 RX ORDER — SODIUM CHLORIDE 0.9 % (FLUSH) 0.9 %
10 SYRINGE (ML) INJECTION EVERY 12 HOURS SCHEDULED
Status: CANCELLED | OUTPATIENT
Start: 2020-07-07

## 2020-07-07 RX ADMIN — SODIUM CHLORIDE 1000 ML: 9 INJECTION, SOLUTION INTRAVENOUS at 16:19

## 2020-07-07 RX ADMIN — SODIUM CHLORIDE: 9 INJECTION, SOLUTION INTRAVENOUS at 19:09

## 2020-07-07 RX ADMIN — TRAMADOL HYDROCHLORIDE 50 MG: 50 TABLET, FILM COATED ORAL at 21:36

## 2020-07-07 RX ADMIN — TRAMADOL HYDROCHLORIDE 50 MG: 50 TABLET ORAL at 21:36

## 2020-07-07 ASSESSMENT — ENCOUNTER SYMPTOMS
ABDOMINAL PAIN: 1
BACK PAIN: 1

## 2020-07-07 ASSESSMENT — PAIN SCALES - GENERAL: PAINLEVEL_OUTOF10: 10

## 2020-07-07 NOTE — ED PROVIDER NOTES
[facility]      TRIAGE CHIEF COMPLAINT:   Back Pain; Leg Pain; and Fatigue      [place]:  Laura Griffin is a 78 y.o. female that presents by EMS with complaint of chronic abdominal pain back pain history of cancer is supposed to go to the [facility] but brought here for lethargy. On arrival patient is protecting her airway she appears tired but she is awake she does follow commands but she would not talk to me. She nods her head yes when asked in pain and when I push on her abdomen and back. Per review of chart she has a history of lymphoma gets treated at the [facility] at [facility]. Is having some chronic pain issues. No family present. Cannot give me further HPI vital signs otherwise stable    REVIEW OF SYSTEMS:    Review of Systems   Constitutional: Positive for activity change, appetite change and fatigue. Gastrointestinal: Positive for abdominal pain. Musculoskeletal: Positive for arthralgias, back pain and myalgias. Neurological: Positive for weakness. Psychiatric/Behavioral: Positive for confusion.        Past Medical History:   Diagnosis Date    Cancer [facility])     Reports \"all over\" and spine    Closed compression fracture of thoracic vertebra ([address] 1/17/2020    DDD (degenerative disc disease), lumbar     Diverticulosis of colon     left colon    DJD (degenerative joint disease) of lumbar spine     DJD (degenerative joint disease), lumbosacral     Family history of type 2 diabetes mellitus     father    Fatty liver     Gastroesophageal reflux disease with hiatal hernia     Hypertension     Intention tremor     Left knee DJD 12-    mild per Xray    Lumbar spinal stenosis     Mass of right lung 1/17/2020    Osteopenia     Screening colonoscopy     diverticulosis,small internal hemorrhoids, otherwise normal-10year follow up recommended by Dr Latosha Alston     Past Surgical History:   Procedure Laterality Date    CHOLECYSTECTOMY  2006    COLONOSCOPY  7/6/2015 diverticulosis, polyp removal, internal hemorrhoids    HIP SURGERY Left 11/01/2019    LUMBAR LAMINECTOMY  1990    L4-5 herniated disc resection    TUBAL LIGATION  1974     Family History   Problem Relation Age of Onset    Stroke Mother     Heart Failure Mother         d. age 80 -CHF    Diabetes Father         d. age 80    Other Brother         Lior-Parkison-White syndrome     Social History     Socioeconomic History    Marital status:       Spouse name: Not on file    Number of children: Not on file    Years of education: Not on file    Highest education level: Not on file   Occupational History    Occupation: RETIRED TEACHER   Social Needs    Financial resource strain: Not on file    Food insecurity     Worry: Not on file     Inability: Not on file    Transportation needs     Medical: Not on file     Non-medical: Not on file   Tobacco Use    Smoking status: Never Smoker    Smokeless tobacco: Never Used   Substance and Sexual Activity    Alcohol use: No     Comment: avoids caffeine    Drug use: No    Sexual activity: Not on file   Lifestyle    Physical activity     Days per week: Not on file     Minutes per session: Not on file    Stress: Not on file   Relationships    Social connections     Talks on phone: Not on file     Gets together: Not on file     Attends Orthodox service: Not on file     Active member of club or organization: Not on file     Attends meetings of clubs or organizations: Not on file     Relationship status: Not on file    Intimate partner violence     Fear of current or ex partner: Not on file     Emotionally abused: Not on file     Physically abused: Not on file     Forced sexual activity: Not on file   Other Topics Concern    Not on file   Social History Narrative    Not on file     Current Facility-Administered Medications   Medication Dose Route Frequency Provider Last Rate Last Dose    lidocaine 4 % external patch 1 patch  1 patch Transdermal Daily Tiffanie Bell DO   1 patch at 07/07/20 1618    0.9 % sodium chloride infusion   Intravenous Continuous Tiffanie Kras, DO         Current Outpatient Medications   Medication Sig Dispense Refill    metoprolol succinate (TOPROL XL) 50 MG extended release tablet TAKE 1 TABLET DAILY 90 tablet 3    famotidine (PEPCID) 20 MG tablet TAKE ONE TABLET BY MOUTH TWICE A DAY 60 tablet 0    sertraline (ZOLOFT) 25 MG tablet Take 1 tablet by mouth nightly 30 tablet 3    melatonin 1 MG tablet Take 10 tablets by mouth nightly 10 tablet 0    traMADol (ULTRAM) 50 MG tablet Take 50 mg by mouth every 6 hours as needed for Pain. 200 mg in the morning, 100 at 5 pm, and 50 or 100 at bedtime      gabapentin (NEURONTIN) 300 MG capsule TAKE 3 CAPSULES DAILY AT 5:00 P. M. AND TAKE 3 CAPSULES AT BEDTIME (Patient taking differently: 300 mg.  Take 1 in the morning, 1 at [address], and 1 at Bedtime) 540 capsule 1    lisinopril (PRINIVIL;ZESTRIL) 10 MG tablet TAKE 1 TABLET DAILY IN THE EVENING FOR BLOOD PRESSURE 90 tablet 3    rOPINIRole (REQUIP) 1 MG tablet TAKE ONE AND ONE-HALF TABLETS AT NIGHT 150 tablet 3      Allergies   Allergen Reactions    Adhesive Tape Other (See Comments) and Rash    Actonel [Risedronate Sodium]     Evista [Raloxifene Hydrochloride]     Fosamax [Alendronate Sodium]     Medrol [Methylprednisolone]      Medrol dosepak makes very jittery    Lansoprazole Rash    Penicillins Rash    Prevacid [Lansoprazole] Rash     Current Facility-Administered Medications   Medication Dose Route Frequency Provider Last Rate Last Dose    lidocaine 4 % external patch 1 patch  1 patch Transdermal Daily Tiffanie Bell DO   1 patch at 07/07/20 1618    0.9 % sodium chloride infusion   Intravenous Continuous Tiffanie Arabella DO         Current Outpatient Medications   Medication Sig Dispense Refill    metoprolol succinate (TOPROL XL) 50 MG extended release tablet TAKE 1 TABLET DAILY 90 tablet 3    famotidine (PEPCID) 20 MG tablet TAKE ONE TABLET BY MOUTH TWICE A DAY 60 tablet 0    sertraline (ZOLOFT) 25 MG tablet Take 1 tablet by mouth nightly 30 tablet 3    melatonin 1 MG tablet Take 10 tablets by mouth nightly 10 tablet 0    traMADol (ULTRAM) 50 MG tablet Take 50 mg by mouth every 6 hours as needed for Pain. 200 mg in the morning, 100 at 5 pm, and 50 or 100 at bedtime      gabapentin (NEURONTIN) 300 MG capsule TAKE 3 CAPSULES DAILY AT 5:00 P. M. AND TAKE 3 CAPSULES AT BEDTIME (Patient taking differently: 300 mg. Take 1 in the morning, [address], and 1 at Bedtime) 540 capsule 1    lisinopril (PRINIVIL;ZESTRIL) 10 MG tablet TAKE 1 TABLET DAILY IN THE EVENING FOR BLOOD PRESSURE 90 tablet 3    rOPINIRole (REQUIP) 1 MG tablet TAKE ONE AND ONE-HALF TABLETS AT NIGHT 150 tablet 3       Nursing Notes Reviewed    VITAL SIGNS:  ED Triage Vitals   Enc Vitals Group      BP       Pulse       Resp       Temp       Temp src       SpO2       Weight       Height       Head Circumference       Peak Flow       Pain Score       Pain Loc       Pain Edu? Excl. in [address]? PHYSICAL EXAM:  Physical Exam  Vitals signs and nursing note reviewed. Constitutional:       General: She is not in acute distress. Appearance: She is well-developed and well-groomed. She is ill-appearing. She is not toxic-appearing or diaphoretic. HENT:      Head: Normocephalic and atraumatic. Right Ear: External ear normal.      Left Ear: External ear normal.      Nose: No congestion or rhinorrhea. Mouth/Throat:      Pharynx: No oropharyngeal exudate or posterior oropharyngeal erythema. Eyes:      General: No scleral icterus. Right eye: No discharge. Left eye: No discharge. Extraocular Movements: Extraocular movements intact. Conjunctiva/sclera: Conjunctivae normal.      Pupils: Pupils are equal, round, and reactive to light. Neck:      Musculoskeletal: Normal range of motion. No neck rigidity.    Cardiovascular:      Rate and Rhythm: Normal rate and regular rhythm. Pulses: Normal pulses. Heart sounds: Normal heart sounds. No murmur. No friction rub. No gallop. Pulmonary:      Effort: Pulmonary effort is normal. No respiratory distress. Breath sounds: Normal breath sounds. No stridor. No wheezing, rhonchi or rales. Abdominal:      General: Bowel sounds are normal. There is no distension. Palpations: Abdomen is soft. There is no mass or pulsatile mass. Tenderness: There is generalized abdominal tenderness. There is no guarding or rebound. Negative signs include Davis's sign, Rovsing's sign and McBurney's sign. Hernia: No hernia is present. Musculoskeletal: Normal range of motion. General: Tenderness present. No swelling, deformity or signs of injury. Thoracic back: She exhibits tenderness. Lumbar back: She exhibits tenderness. Right lower leg: No edema. Left lower leg: No edema. Skin:     General: Skin is warm. Coloration: Skin is pale. Skin is not jaundiced. Findings: No bruising, erythema, lesion or rash. Neurological:      Mental Status: She is lethargic and disoriented. GCS: GCS eye subscore is 4. GCS verbal subscore is 1. GCS motor subscore is 6. Cranial Nerves: Cranial nerves are intact. No cranial nerve deficit, dysarthria or facial asymmetry. Sensory: Sensation is intact. No sensory deficit. Motor: Weakness present. No tremor, atrophy, abnormal muscle tone or seizure activity. Coordination: Coordination is intact. Coordination normal.      Comments: General weakness   Psychiatric:         Behavior: Behavior is uncooperative.            I have reviewed andinterpreted all of the currently available lab results from this visit (if applicable):    Results for orders placed or performed during the hospital encounter of 07/07/20   CBC Auto Differential   Result Value Ref Range    WBC 5.9 4.0 - 10.5 K/CU MM    RBC 3.74 (L) 4.2 - 5.4 (H) 28 - 48 mmHG    Base Exc, Mixed 1.7 0 - 2.3    HCO3, Venous 24.4 19 - 25 MMOL/L    O2 Sat, Jimbo 93.2 (H) 50 - 70 %    Comment VBG    Protime/INR & PTT   Result Value Ref Range    Protime 27.2 (H) 11.7 - 14.5 SECONDS    INR 2.23 INDEX    aPTT 37.9 (H) 25.1 - 37.1 SECONDS   Lipase   Result Value Ref Range    Lipase 10 (L) 13 - 60 IU/L   EKG 12 Lead   Result Value Ref Range    Ventricular Rate 90 BPM    Atrial Rate 90 BPM    P-R Interval 138 ms    QRS Duration 78 ms    Q-T Interval 398 ms    QTc Calculation (Bazett) 486 ms    P Axis 28 degrees    R Axis -10 degrees    T Axis 31 degrees    Diagnosis       Sinus rhythm with premature supraventricular complexes and with occasional premature ventricular complexes  Minimal voltage criteria for LVH, may be normal variant  Borderline ECG  When compared with ECG of 02-JUN-2020 21:55,  fusion complexes are no longer present  premature ventricular complexes are now present  premature supraventricular complexes are now present  Vent. rate has decreased BY  49 BPM  Nonspecific T wave abnormality no longer evident in Lateral leads          Radiographs (if obtained):  [] The following radiograph was interpreted by myself in the absence of a radiologist:  [x] Radiologist's Report Reviewed:    Xray hips/pelvis, CXR, CT brain, T/L spine, CT abd/pelv    Xr Knee Left (1-2 Views)    Result Date: 7/3/2020  EXAMINATION: TWO X-RAY VIEWS OF THE LEFT KNEE; ONE X-RAY VIEW OF THE PELVIS AND TWO X-RAY VIEWS LEFT HIP 7/1/2020 7:56 am COMPARISON: CT 01/19/2020, radiograph 09/13/2018 HISTORY: ORDERING SYSTEM PROVIDED HISTORY: Fall, pain TECHNOLOGIST PROVIDED HISTORY: Reason for exam:->Fall, pain Reason for Exam: Fall & pain Acuity: Unknown Type of Exam: Unknown ORDERING SYSTEM PROVIDED HISTORY: Fall, pain TECHNOLOGIST PROVIDED HISTORY: Reason for exam:->Fall, pain Reason for Exam: Fall, pain Acuity: Unknown Type of Exam: Unknown FINDINGS: Left knee: Small joint effusion.   Mineralized density along the medial femoral condyle. There is mild tricompartmental degenerative changes with subtle chondrocalcinosis. Patellar enthesophyte along the superior aspect. Soft tissues otherwise unremarkable. Pelvis/left hip: Moderate to severe degenerative changes of the right hip joint. Postsurgical changes from left hip arthroplasty. Left orthopedic hardware is intact. No acute fracture or dislocation. Osteopenia. The pelvic ring is intact. SI joints are grossly maintained. 1. Mineralized density along the medial femoral condyle of the left knee which may be related to an age-indeterminate avulsion injury versus calcific tendonitis. Findings can be correlated with focal area of pain. Otherwise no acute osseous abnormality in the left knee. 2. Postsurgical changes from left hip arthroplasty. Degenerative changes of the right hip joint. No new acute osseous abnormality within the pelvis or left hip. Xr Foot Right (2 Views)    Result Date: 7/1/2020  EXAMINATION: TWO XRAY VIEWS OF THE RIGHT FOOT 7/1/2020 7:56 am COMPARISON: None. HISTORY: ORDERING SYSTEM PROVIDED HISTORY: fall, pain TECHNOLOGIST PROVIDED HISTORY: Reason for exam:->fall, pain Reason for Exam: fall, pain Acuity: Unknown Type of Exam: Unknown FINDINGS: The bones are osteopenic. Joint alignment is maintained. No acute fracture or dislocation in the right foot. Small plantar calcaneal enthesophyte. Dorsal midfoot soft tissue swelling. Osteopenia. No acute osseous abnormality in the right foot. Dorsal midfoot soft tissue swelling.      Xr Hip 2-3 Vw W Pelvis Left    Result Date: 7/3/2020  EXAMINATION: TWO X-RAY VIEWS OF THE LEFT KNEE; ONE X-RAY VIEW OF THE PELVIS AND TWO X-RAY VIEWS LEFT HIP 7/1/2020 7:56 am COMPARISON: CT 01/19/2020, radiograph 09/13/2018 HISTORY: ORDERING SYSTEM PROVIDED HISTORY: Fall, pain TECHNOLOGIST PROVIDED HISTORY: Reason for exam:->Fall, pain Reason for Exam: Fall & pain Acuity: Unknown Type of Exam: Unknown ORDERING SYSTEM PROVIDED HISTORY: Fall, pain TECHNOLOGIST PROVIDED HISTORY: Reason for exam:->Fall, pain Reason for Exam: Fall, pain Acuity: Unknown Type of Exam: Unknown FINDINGS: Left knee: Small joint effusion. Mineralized density along the medial femoral condyle. There is mild tricompartmental degenerative changes with subtle chondrocalcinosis. Patellar enthesophyte along the superior aspect. Soft tissues otherwise unremarkable. Pelvis/left hip: Moderate to severe degenerative changes of the right hip joint. Postsurgical changes from left hip arthroplasty. Left orthopedic hardware is intact. No acute fracture or dislocation. Osteopenia. The pelvic ring is intact. SI joints are grossly maintained. 1. Mineralized density along the medial femoral condyle of the left knee which may be related to an age-indeterminate avulsion injury versus calcific tendonitis. Findings can be correlated with focal area of pain. Otherwise no acute osseous abnormality in the left knee. 2. Postsurgical changes from left hip arthroplasty. Degenerative changes of the right hip joint. No new acute osseous abnormality within the pelvis or left hip. EKG (if obtained): (All EKG's are interpreted by myself in the absence of a cardiologist)    12 lead EKG per my interpretation:  Normal Sinus Rhythm 90  Axis is   Left axis deviation  QTc is  486  There is no specific T wave changes appreciated. There is no specific ST wave changes appreciated. Prior EKG to compare with was not available       Total critical care time today provided was at least 30 minutes. This excludes seperately billable procedure. Critical care time provided for reviewing labs, images, giving IV fluids, consulting [facility], consulting \[facility]\"" medicine that required close evaluation and/or intervention with concern for patient decompensation. MDM:    Patient here with chronic pain, history of cancer, lethargy, fatigue.   Again reportedly per nursing staff patient was supposed to go to [facility] after coming here and being stabilized. Patient gets treated at the [facility] for history of lymphoma. Patient on arrival is lethargic she is protecting her airway she is following commands will wake up but will not talk to me asked her to talk she shakes her head no. She is moving all extremities I do not see any facial droop again she is not talking to me but she is moving all extremities no signs of unilateral weakness otherwise. Patient had altered mental status work perform infectious work-up performed. I did talk to the [facility] they are on diversion but would like patient taken there but are waiting bed assignment, open bed, clean bed. Patient will be admitted here in the meantime for observation pain control work-up shows chronic issues does seem to be resolving, improving lung mass patient minute for observation otherwise stable. Will need transfer to [facility] when bed opens up.     CLINICAL IMPRESSION:  Final diagnoses:   History of cancer   Back pain, unspecified back location, unspecified back pain laterality, unspecified chronicity   Lethargy   Altered mental status, unspecified altered mental status type       (Please note that portions of this note may have been completed with a voice recognition program. Efforts were made to edit the dictations but occasionally words aremis-transcribed.)    DISPOSITION REFERRAL (if applicable):  [facility] [place], [place]  Zander [phone] [address] Sonja Sánchez Jenelle [address] (if applicable):  New Prescriptions    No medications on file          Arabella Greenfield, [facility],   07/07/20 [id number]

## 2020-07-07 NOTE — ED NOTES
1926 notified Nikhil Chou mid level with [facility] on patient being transferred to [facility].       Vista Landau  07/07/20 1927

## 2020-07-07 NOTE — H&P
History and Physical      Name:  Armen Craven /Age/Sex: 2042  (75 y.o. female)   MRN & CSN:  [id number] & [id number] Admission Date/Time: 2020  3:04 PM   Location:  ED29/ED-29 PCP: Quinton Elizalde[address] Day: 1        Admitting Physician: Dr. Mumtaz Lee and Plan:   Armen Craven is a 78 y.o. female who presents with Back Pain; Leg Pain; and Fatigue     Hyponatremia (124)   Admit to Med/Surg   IVF   Serial BMPs   Pending urine indices       Generalized weakness   Fall precautions    PT/OT evaluation if does not transfer       Lymphoma   Pending transfer to [facility] per oncology team recommendations-currently on diversion      Patient S&E with admission orders placed at [facility]. Notified at [facility] has accepted and pending transfer from ED. Patient case discussed with ED provider    Diet    DVT Prophylaxis [x] Lovenox, []  Heparin, [] SCDs, [] Ambulation  [] Long term AC   GI Prophylaxis [] PPI,  [] H2 Blocker,  [] Carafate,  [] Diet/Tube Feeds   Code Status Full     Disposition Admit to obs. Transfer to [facility] pending    MDM [] Low, [x] Moderate,[]  High     -Patient assessment and plan discussed and reviewed with admitting physician: Mazin Motley MD    History of Present Illness:     Chief Complaint: Back Pain; Leg Pain; and Fatigue    Armen Craven is a 78 y.o. female who presents with from [facility] with above complaints. Upon initiating interview the patient states \" I hope you are here to help me because nobody else will\". A/O x2 poor situational awareness. The patient also denies any pain and states \" that is what they said at the nursing home said but I dont hurt\" She does state she feels fatigued. Apparently transfer to [facility] was initiated in ED. Per chart review oncology team at [facility] requesting transfer d/t 1-2 weeks of disorientation and increased back pain are progression of lymphoma. Ten point ROS: reviewed negative, unless as noted in above HPI.     Objective: No intake or output data in the 24 hours ending 07/07/20 1825     Vitals:   Vitals:    07/07/20 1507 07/07/20 1609 07/07/20 1815   BP:  (!) 168/91    Pulse:   96   Resp:   14   SpO2:  96% 98%   Weight: 130 lb (59 kg)     Height: 5' 2\" (1.575 m)         Physical Exam: 07/07/20     GEN -Awake frail/chronically ill appearing female, sitting upright in bed , NAD. normal body habitus. Appears given age. EYES -PERRLA. No scleral erythema, discharge, or conjunctivitis. HENT -MM are moist. Oral pharynx without exudates, no evidence of thrush. NECK -Supple, no apparent thyromegaly or masses. RESP -CTA, no wheezes, rales or rhonchi. Symmetric chest movement while on RA.   C/V -S1/S2 auscultated. RRR without appreciable M/R/G. No JVD or carotid bruits. Peripheral pulses equal bilaterally and palpable. Cap refill <3 sec. No peripheral edema. GI -Abdomen is soft non distended and without significant TTP. + BS. No masses or guarding. Rectal exam deferred. No HSM   -No CVA/ flank tenderness. Krishnan catheter is not present. LYMPH-No palpable cervical lymphadenopathy and no hepatosplenomegaly. No petechiae or ecchymoses. MS -No gross joint deformities. SKIN -Normal coloration, warm, dry. NEURO-Cranial nerves appear grossly intact, normal speech, no lateralizing weakness. PSYC-Awake, alert, oriented x 2- person, place, time, poor situational,  Appropriate affect.     Past Medical History:      Past Medical History:   Diagnosis Date    Cancer [facility])     Reports \"all over\" and spine    Closed compression fracture of thoracic vertebra ([facility] [address] 1/17/2020    DDD (degenerative disc disease), lumbar     Diverticulosis of colon     left colon    DJD (degenerative joint disease) of lumbar spine     DJD (degenerative joint disease), lumbosacral     Family history of type 2 diabetes mellitus     father    Fatty liver     Gastroesophageal reflux disease with hiatal hernia     Hypertension     Intention tremor     Left history of alcohol use. Drugs:  reports no history of drug use. Allergies: Allergies   Allergen Reactions    Adhesive Tape Other (See Comments) and Rash    Actonel [Risedronate Sodium]     Evista [Raloxifene Hydrochloride]     Fosamax [Alendronate Sodium]     Medrol [Methylprednisolone]      Medrol dosepak makes very jittery    Lansoprazole Rash    Penicillins Rash    Prevacid [Lansoprazole] Rash       Home Medications:     Prior to Admission medications    Medication Sig Start Date End Date Taking? Authorizing Provider   metoprolol succinate (TOPROL XL) 50 MG extended release tablet TAKE 1 TABLET DAILY 5/1/20   Romie Tanner DO   famotidine (PEPCID) 20 MG tablet TAKE ONE TABLET BY MOUTH TWICE A DAY 2/24/20   Romie Tanner DO   sertraline (ZOLOFT) 25 MG tablet Take 1 tablet by mouth nightly 1/21/20   Dinah Owens MD   melatonin 1 MG tablet Take 10 tablets by mouth nightly 1/21/20   Dinah Owens MD   traMADol (ULTRAM) 50 MG tablet Take 50 mg by mouth every 6 hours as needed for Pain. 200 mg in the morning, 100 at 5 pm, and 50 or 100 at bedtime    Historical Provider, MD   gabapentin (NEURONTIN) 300 MG capsule TAKE 3 CAPSULES DAILY AT 5:00 P. M. AND TAKE 3 CAPSULES AT BEDTIME  Patient taking differently: 300 mg.  Take 1 in the morning, [facility] [address] [facility] [address], and 1 at Bedtime 11/25/19 5/27/20  Rick Hill MD   lisinopril (PRINIVIL;ZESTRIL) 10 MG tablet TAKE 1 TABLET DAILY IN THE EVENING FOR BLOOD PRESSURE 7/22/19   Rick Hill MD   rOPINIRole (REQUIP) 1 MG tablet TAKE ONE AND ONE-HALF TABLETS AT NIGHT 7/22/19   Rick Hill MD         Medications:   Medications:    lidocaine  1 patch Transdermal Daily      Infusions:    sodium chloride       PRN Meds:      Data:     Laboratory this visit:  Reviewed  Recent Labs     07/07/20  1530   WBC 5.9   HGB 11.0*   HCT 34.8*         Recent Labs     07/07/20  1530   *   K 4.2   CL 92*   CO2 22   BUN 9 CREATININE 0.4*     Recent Labs     07/07/20  1530   AST 26   ALT 17   BILITOT 0.7   ALKPHOS 76     Recent Labs     07/07/20  1530   INR 2.23         Radiology this visit:  Reviewed. Ct Abdomen Pelvis Wo Contrast Additional Contrast? Oral    Result Date: 7/7/2020  EXAMINATION: CT OF THE ABDOMEN AND PELVIS WITHOUT CONTRAST 7/7/2020 3:51 pm TECHNIQUE: CT of the abdomen and pelvis was performed without the administration of intravenous contrast. Multiplanar reformatted images are provided for review. Dose modulation, iterative reconstruction, and/or weight based adjustment of the mA/kV was utilized to reduce the radiation dose to as low as reasonably achievable. COMPARISON: 1/19/2020 HISTORY: ORDERING SYSTEM PROVIDED HISTORY: abd pain/cancer TECHNOLOGIST PROVIDED HISTORY: Reason for exam:->abd pain/cancer Additional Contrast?->Oral Reason for Exam: abd pain/cancer Acuity: Acute Type of Exam: Initial Additional signs and symptoms: unknown Relevant Medical/Surgical History: poor historian FINDINGS: Lower Chest: Bibasilar atelectasis. Trace left pleural effusion. Overall decrease in size of a subpleural right lower lobe mass, only partially visualized. Subpleural nodule in the right middle lobe is not well seen on this exam may be due to field of view. Organs: Stable 3.3 cm left hepatic cyst.  The unenhanced spleen, liver, pancreas and adrenal glands are without focal abnormality. Status post cholecystectomy. No renal or ureteral calculus. No hydronephrosis or perinephric stranding. Right renal cysts are again noted. Mild pelviectasis bilaterally. GI/Bowel: Small hiatal hernia. No dilated loops of bowel or bowel wall thickening. Duodenal diverticula. Scattered colonic diverticula without acute diverticulitis. Moderate amount stool in the colon. The appendix is normal.  No free air. Pelvis: Limited evaluation of the pelvis due to streak artifact from left hip arthroplasty.   There is a pessary device in place. No bladder wall thickening. No pelvic adenopathy or free fluid. There is a calcified uterine fibroid. Peritoneum/Retroperitoneum: The aorta is normal in caliber with mild atherosclerosis. No retroperitoneal or mesenteric adenopathy. No ascites or drainable fluid collection. Bones/Soft Tissues: Left hip arthroplasty. No acute osseous abnormality. 1. No acute abdominal or pelvic abnormality on this unenhanced study. 2. Partially visualized paraspinal mass in the right lower lobe as well as right middle lobe nodule are no longer visualized, favoring treatment response. Consider dedicated CT of the chest once acute issues have resolved to evaluate lymphoma. Xr Hip Left (2-3 Views)    Result Date: 7/7/2020  EXAMINATION: TWO X-RAY VIEWS OF THE LEFT HIP; ONE X-RAY VIEW OF THE PELVIS AND TWO X-RAY VIEWS RIGHT HIP 7/7/2020 3:55 pm; 7/7/2020 3:56 pm COMPARISON: 07/01/2020 HISTORY: ORDERING SYSTEM PROVIDED HISTORY: Trauma TECHNOLOGIST PROVIDED HISTORY: Portable - left hip Reason for exam:->Trauma Reason for Exam: Pain Acuity: Acute Type of Exam: Initial Mechanism of Injury: Fall Relevant Medical/Surgical History: Left hip arthroplasty FINDINGS: Diffuse osteopenia. Postsurgical changes from left hip arthroplasty. Left orthopedic hardware is intact and stable. The pelvic ring is intact. The sacroiliac joints are maintained. Moderate degenerative changes of the right hip joint. No acute fracture or dislocation. Stable exam from 07/01/2020. Intact stable left hip arthroplasty. No acute osseous abnormality. Given the degree of osteopenia, nondisplaced fractures may be radiographically occult. If pain or concern for fracture persists, consider MR or CT imaging.      Xr Knee Left (1-2 Views)    Result Date: 7/3/2020  EXAMINATION: TWO X-RAY VIEWS OF THE LEFT KNEE; ONE X-RAY VIEW OF THE PELVIS AND TWO X-RAY VIEWS LEFT HIP 7/1/2020 7:56 am COMPARISON: CT 01/19/2020, radiograph 09/13/2018 HISTORY: ORDERING SYSTEM PROVIDED HISTORY: Fall, pain TECHNOLOGIST PROVIDED HISTORY: Reason for exam:->Fall, pain Reason for Exam: Fall & pain Acuity: Unknown Type of Exam: Unknown ORDERING SYSTEM PROVIDED HISTORY: Fall, pain TECHNOLOGIST PROVIDED HISTORY: Reason for exam:->Fall, pain Reason for Exam: Fall, pain Acuity: Unknown Type of Exam: Unknown FINDINGS: Left knee: Small joint effusion. Mineralized density along the medial femoral condyle. There is mild tricompartmental degenerative changes with subtle chondrocalcinosis. Patellar enthesophyte along the superior aspect. Soft tissues otherwise unremarkable. Pelvis/left hip: Moderate to severe degenerative changes of the right hip joint. Postsurgical changes from left hip arthroplasty. Left orthopedic hardware is intact. No acute fracture or dislocation. Osteopenia. The pelvic ring is intact. SI joints are grossly maintained. 1. Mineralized density along the medial femoral condyle of the left knee which may be related to an age-indeterminate avulsion injury versus calcific tendonitis. Findings can be correlated with focal area of pain. Otherwise no acute osseous abnormality in the left knee. 2. Postsurgical changes from left hip arthroplasty. Degenerative changes of the right hip joint. No new acute osseous abnormality within the pelvis or left hip. Xr Foot Right (2 Views)    Result Date: 7/1/2020  EXAMINATION: TWO XRAY VIEWS OF THE RIGHT FOOT 7/1/2020 7:56 am COMPARISON: None. HISTORY: ORDERING SYSTEM PROVIDED HISTORY: fall, pain TECHNOLOGIST PROVIDED HISTORY: Reason for exam:->fall, pain Reason for Exam: fall, pain Acuity: Unknown Type of Exam: Unknown FINDINGS: The bones are osteopenic. Joint alignment is maintained. No acute fracture or dislocation in the right foot. Small plantar calcaneal enthesophyte. Dorsal midfoot soft tissue swelling. Osteopenia. No acute osseous abnormality in the right foot. Dorsal midfoot soft tissue swelling. Ct Head Wo Contrast    Result Date: 7/7/2020  EXAMINATION: CT OF THE HEAD WITHOUT CONTRAST  7/7/2020 4:05 pm TECHNIQUE: CT of the head was performed without the administration of intravenous contrast. Dose modulation, iterative reconstruction, and/or weight based adjustment of the mA/kV was utilized to reduce the radiation dose to as low as reasonably achievable. COMPARISON: None. HISTORY: ORDERING SYSTEM PROVIDED HISTORY: ams/cancer TECHNOLOGIST PROVIDED HISTORY: Reason for exam:->ams/cancer Has a \"code stroke\" or \"stroke alert\" been called? ->No Reason for Exam: ams/cancer Acuity: Acute Type of Exam: Initial Additional signs and symptoms: none Relevant Medical/Surgical History: none FINDINGS: BRAIN/VENTRICLES: Study is motion degraded. Repeat images were obtained. There is mild patchy periventricular and subcortical white matter low attenuation that is nonspecific but most consistent with chronic small vessel ischemia. There is no acute intracranial hemorrhage, mass effect or midline shift. No abnormal extra-axial fluid collection. The gray-white differentiation is maintained without evidence of an acute infarct. There is no evidence of hydrocephalus. ORBITS: The visualized portion of the orbits demonstrate no acute abnormality. SINUSES: The visualized paranasal sinuses and mastoid air cells demonstrate no acute abnormality. SOFT TISSUES/SKULL:  No acute abnormality of the visualized skull or soft tissues. No acute intracranial abnormality. Mild chronic small vessel ischemic white matter disease. Ct Thoracic Spine Wo Contrast    Result Date: 7/7/2020  EXAMINATION: CT OF THE THORACIC SPINE WITHOUT CONTRAST; CT OF THE LUMBAR SPINE WITHOUT CONTRAST 7/7/2020 TECHNIQUE: CT of the thoracic spine was performed without the administration of intravenous contrast. Multiplanar reformatted images are provided for review.  Dose modulation, iterative reconstruction, and/or weight based adjustment of the mA/kV was utilized to reduce the radiation dose to as low as reasonably achievable.; CT of the lumbar spine was performed without the administration of intravenous contrast. Multiplanar reformatted images are provided for review. Dose modulation, iterative reconstruction, and/or weight based adjustment of the mA/kV was utilized to reduce the radiation dose to as low as reasonably achievable. COMPARISON: 01/25/2020 HISTORY: ORDERING SYSTEM PROVIDED HISTORY: back pain/cancer TECHNOLOGIST PROVIDED HISTORY: CT  T-Spine w/o Contrast Reason for exam:->back pain/cancer Reason for Exam: back pain/cancer Acuity: Acute Type of Exam: Initial Additional signs and symptoms: unknown Relevant Medical/Surgical History: poor historian; ORDERING SYSTEM PROVIDED HISTORY: back pain/cancer TECHNOLOGIST PROVIDED HISTORY: Reason for exam:->back pain/cancer Reason for Exam: back pain/cancer; other Acuity: Acute Type of Exam: Initial Additional signs and symptoms: unknown Relevant Medical/Surgical History: poor historian FINDINGS: BONES/ALIGNMENT: There is a compression fracture of T6 resulting in 66% loss of vertebral body height. The underlying vertebral body is sclerotic in appearance. DEGENERATIVE CHANGES: There are multilevel degenerative changes, worst at L4-5, resulting in moderate right and moderate to severe left-sided osseous neural foraminal stenosis. There is also moderate bilateral osseous neural foraminal stenosis at L5-S1. SOFT TISSUES: No paraspinal mass is seen. Redemonstration of probable pathologic compression fracture of T6.      Xr Chest Portable    Result Date: 7/7/2020  EXAMINATION: ONE XRAY VIEW OF THE CHEST 7/7/2020 3:09 pm COMPARISON: 06/02/2020 HISTORY: ORDERING SYSTEM PROVIDED HISTORY: fatigue TECHNOLOGIST PROVIDED HISTORY: Reason for exam:->fatigue Reason for Exam: fatigue Acuity: Acute Type of Exam: Initial Additional signs and symptoms: na Relevant Medical/Surgical History: na FINDINGS: A right PICC line was noted with the distal tip in the region of the right atrium. Metallic surgical clips were noted in the right upper abdominal quadrant probably from prior cholecystectomy. .  No cardiomegaly, pneumonia, interstitial edema or pleural effusions were noted. No hilar mass was identified. The regional skeleton was unremarkable. No significant change has occurred from 06/02/2020. No cardiomegaly, pneumonia or interstitial edema. Right PICC line with the distal tip in the region of the right atrium. No significant change has occurred from 06/02/2020. Ct Lumbar Reconstruction Wo Post Process    Result Date: 7/7/2020  EXAMINATION: CT OF THE THORACIC SPINE WITHOUT CONTRAST; CT OF THE LUMBAR SPINE WITHOUT CONTRAST 7/7/2020 TECHNIQUE: CT of the thoracic spine was performed without the administration of intravenous contrast. Multiplanar reformatted images are provided for review. Dose modulation, iterative reconstruction, and/or weight based adjustment of the mA/kV was utilized to reduce the radiation dose to as low as reasonably achievable.; CT of the lumbar spine was performed without the administration of intravenous contrast. Multiplanar reformatted images are provided for review. Dose modulation, iterative reconstruction, and/or weight based adjustment of the mA/kV was utilized to reduce the radiation dose to as low as reasonably achievable.  COMPARISON: 01/25/2020 HISTORY: ORDERING SYSTEM PROVIDED HISTORY: back pain/cancer TECHNOLOGIST PROVIDED HISTORY: CT  T-Spine w/o Contrast Reason for exam:->back pain/cancer Reason for Exam: back pain/cancer Acuity: Acute Type of Exam: Initial Additional signs and symptoms: unknown Relevant Medical/Surgical History: poor historian; ORDERING SYSTEM PROVIDED HISTORY: back pain/cancer TECHNOLOGIST PROVIDED HISTORY: Reason for exam:->back pain/cancer Reason for Exam: back pain/cancer; other Acuity: Acute Type of Exam: Initial Additional signs and symptoms: unknown Relevant Medical/Surgical History: poor historian FINDINGS: BONES/ALIGNMENT: There is a compression fracture of T6 resulting in 66% loss of vertebral body height. The underlying vertebral body is sclerotic in appearance. DEGENERATIVE CHANGES: There are multilevel degenerative changes, worst at L4-5, resulting in moderate right and moderate to severe left-sided osseous neural foraminal stenosis. There is also moderate bilateral osseous neural foraminal stenosis at L5-S1. SOFT TISSUES: No paraspinal mass is seen. Redemonstration of probable pathologic compression fracture of T6. Xr Hip 1 Vw W Pelvis Right    Result Date: 7/7/2020  EXAMINATION: TWO X-RAY VIEWS OF THE LEFT HIP; ONE X-RAY VIEW OF THE PELVIS AND TWO X-RAY VIEWS RIGHT HIP 7/7/2020 3:55 pm; 7/7/2020 3:56 pm COMPARISON: 07/01/2020 HISTORY: ORDERING SYSTEM PROVIDED HISTORY: Trauma TECHNOLOGIST PROVIDED HISTORY: Portable - left hip Reason for exam:->Trauma Reason for Exam: Pain Acuity: Acute Type of Exam: Initial Mechanism of Injury: Fall Relevant Medical/Surgical History: Left hip arthroplasty FINDINGS: Diffuse osteopenia. Postsurgical changes from left hip arthroplasty. Left orthopedic hardware is intact and stable. The pelvic ring is intact. The sacroiliac joints are maintained. Moderate degenerative changes of the right hip joint. No acute fracture or dislocation. Stable exam from 07/01/2020. Intact stable left hip arthroplasty. No acute osseous abnormality. Given the degree of osteopenia, nondisplaced fractures may be radiographically occult. If pain or concern for fracture persists, consider MR or CT imaging.      Xr Hip 2-3 Vw W Pelvis Left    Result Date: 7/3/2020  EXAMINATION: TWO X-RAY VIEWS OF THE LEFT KNEE; ONE X-RAY VIEW OF THE PELVIS AND TWO X-RAY VIEWS LEFT HIP 7/1/2020 7:56 am COMPARISON: CT 01/19/2020, radiograph 09/13/2018 HISTORY: ORDERING SYSTEM PROVIDED HISTORY: Fall, pain TECHNOLOGIST PROVIDED HISTORY: Reason for exam:->Fall, pain Reason for Exam: Fall & pain Acuity: Unknown Type of Exam: Unknown ORDERING SYSTEM PROVIDED HISTORY: Fall, pain TECHNOLOGIST PROVIDED HISTORY: Reason for exam:->Fall, pain Reason for Exam: Fall, pain Acuity: Unknown Type of Exam: Unknown FINDINGS: Left knee: Small joint effusion. Mineralized density along the medial femoral condyle. There is mild tricompartmental degenerative changes with subtle chondrocalcinosis. Patellar enthesophyte along the superior aspect. Soft tissues otherwise unremarkable. Pelvis/left hip: Moderate to severe degenerative changes of the right hip joint. Postsurgical changes from left hip arthroplasty. Left orthopedic hardware is intact. No acute fracture or dislocation. Osteopenia. The pelvic ring is intact. SI joints are grossly maintained. 1. Mineralized density along the medial femoral condyle of the left knee which may be related to an age-indeterminate avulsion injury versus calcific tendonitis. Findings can be correlated with focal area of pain. Otherwise no acute osseous abnormality in the left knee. 2. Postsurgical changes from left hip arthroplasty. Degenerative changes of the right hip joint. No new acute osseous abnormality within the pelvis or left hip.          EKG this visit:  Reviewed       Electronically signed by JEAN Robledo CNP on 7/7/2020 at 6:25 PM

## 2020-07-07 NOTE — ED NOTES
This nurse received report from a NP at [facility]. The patient has a significant history of lymphoma, per the NP the patient has had an increase in back pain that radiates to her legs and difficulty walking. They are concerned this is related to her lymphoma and would like her transferred to [facility] after testing is complete here.      Ross Tadeo RN  07/07/20 [id number]

## 2020-07-07 NOTE — ED NOTES
1924 called [facility] for transportation to [facility].  ETA [id number]     Maryam Diamond  07/07/20 1925

## 2020-07-07 NOTE — ED TRIAGE NOTES
Patient presents to ED by EMS from [facility]. EMS reports a complaint of altered mental status and lethargy.

## 2020-07-08 PROCEDURE — 93010 ELECTROCARDIOGRAM REPORT: CPT | Performed by: INTERNAL MEDICINE

## 2020-07-08 NOTE — ED NOTES
[facility] and left message on number provided. Call back number left for them to return this nurses call.      Matty Pennington RN  07/07/20 1617

## 2020-07-10 LAB
EKG ATRIAL RATE: 90 BPM
EKG DIAGNOSIS: NORMAL
EKG P AXIS: 28 DEGREES
EKG P-R INTERVAL: 138 MS
EKG Q-T INTERVAL: 398 MS
EKG QRS DURATION: 78 MS
EKG QTC CALCULATION (BAZETT): 486 MS
EKG R AXIS: -10 DEGREES
EKG T AXIS: 31 DEGREES
EKG VENTRICULAR RATE: 90 BPM

## 2020-07-12 LAB
CULTURE: NORMAL
Lab: NORMAL
SPECIMEN: NORMAL